# Patient Record
Sex: MALE | Race: WHITE
[De-identification: names, ages, dates, MRNs, and addresses within clinical notes are randomized per-mention and may not be internally consistent; named-entity substitution may affect disease eponyms.]

---

## 2019-04-16 ENCOUNTER — HOSPITAL ENCOUNTER (OUTPATIENT)
Age: 59
Discharge: HOME | End: 2019-04-16
Payer: COMMERCIAL

## 2019-04-16 VITALS — BODY MASS INDEX: 34.7 KG/M2

## 2019-04-16 DIAGNOSIS — E78.2: ICD-10-CM

## 2019-04-16 DIAGNOSIS — I10: ICD-10-CM

## 2019-04-16 DIAGNOSIS — R35.0: Primary | ICD-10-CM

## 2019-04-16 LAB
ALANINE AMINOTRANSFER ALT/SGPT: 33 U/L (ref 16–61)
ALBUMIN SERPL-MCNC: 4.1 G/DL (ref 3.2–5)
ALKALINE PHOSPHATASE: 71 U/L (ref 45–117)
ANION GAP: 6 (ref 5–15)
AST(SGOT): 21 U/L (ref 15–37)
BUN SERPL-MCNC: 19 MG/DL (ref 7–18)
BUN/CREAT RATIO: 15.3 RATIO (ref 10–20)
CALCIUM SERPL-MCNC: 8.7 MG/DL (ref 8.5–10.1)
CARBON DIOXIDE: 25 MMOL/L (ref 21–32)
CHLORIDE: 109 MMOL/L (ref 98–107)
CHOLEST SERPL-MCNC: 176 MG/DL
DEPRECATED RDW RBC: 45.2 FL (ref 35.1–43.9)
DIFFERENTIAL INDICATED: (no result)
ERYTHROCYTE [DISTWIDTH] IN BLOOD: 13.7 % (ref 11.6–14.6)
EST GLOM FILT RATE - AFR AMER: 77 ML/MIN (ref 60–?)
GLOBULIN: 2.9 G/DL (ref 2.2–4.2)
GLUCOSE: 92 MG/DL (ref 74–106)
HCT VFR BLD AUTO: 43.9 % (ref 40–54)
HEMOGLOBIN: 14.5 G/DL (ref 13–16.5)
HGB BLD-MCNC: 14.5 G/DL (ref 13–16.5)
IMMATURE GRANULOCYTES COUNT: 0.01 X10^3/UL (ref 0–0)
MCV RBC: 92.8 FL (ref 80–94)
MEAN CORP HGB CONC: 33 G/GL (ref 32–36)
MEAN PLATELET VOL.: 11.4 FL (ref 6.2–12)
PLATELET # BLD: 251 K/MM3 (ref 150–450)
PLATELET COUNT: 251 K/MM3 (ref 150–450)
POSITIVE COUNT: NO
POSITIVE DIFFERENTIAL: NO
POSITIVE MORPHOLOGY: NO
POTASSIUM: 4 MMOL/L (ref 3.5–5.1)
PSA,TOTAL - ANNUAL SCREEN: 0.67 NG/ML (ref 0–4)
RBC # BLD AUTO: 4.73 M/MM3 (ref 4.6–6.2)
RBC DISTRIBUTION WIDTH CV: 13.7 % (ref 11.6–14.6)
RBC DISTRIBUTION WIDTH SD: 45.2 FL (ref 35.1–43.9)
TRIGLYCERIDES: 170 MG/DL
VLDLC SERPL-MCNC: 34 MG/DL (ref 5–40)
WBC # BLD AUTO: 6.4 K/MM3 (ref 4.4–11)
WHITE BLOOD COUNT: 6.4 K/MM3 (ref 4.4–11)

## 2019-04-16 PROCEDURE — 80061 LIPID PANEL: CPT

## 2019-04-16 PROCEDURE — G0103 PSA SCREENING: HCPCS

## 2019-04-16 PROCEDURE — 85025 COMPLETE CBC W/AUTO DIFF WBC: CPT

## 2019-04-16 PROCEDURE — 80053 COMPREHEN METABOLIC PANEL: CPT

## 2019-04-16 PROCEDURE — 84153 ASSAY OF PSA TOTAL: CPT

## 2020-05-19 ENCOUNTER — HOSPITAL ENCOUNTER (OUTPATIENT)
Age: 60
End: 2020-05-19
Payer: COMMERCIAL

## 2020-05-19 VITALS — BODY MASS INDEX: 35.3 KG/M2

## 2020-05-19 DIAGNOSIS — Z12.5: ICD-10-CM

## 2020-05-19 DIAGNOSIS — I10: Primary | ICD-10-CM

## 2020-05-19 DIAGNOSIS — E78.2: ICD-10-CM

## 2020-05-19 LAB
ALANINE AMINOTRANSFER ALT/SGPT: 46 U/L (ref 16–61)
ALBUMIN SERPL-MCNC: 4.2 G/DL (ref 3.2–5)
ALKALINE PHOSPHATASE: 67 U/L (ref 45–117)
ANION GAP: 7 (ref 5–15)
AST(SGOT): 26 U/L (ref 15–37)
BUN SERPL-MCNC: 20 MG/DL (ref 7–18)
BUN/CREAT RATIO: 16.7 RATIO (ref 10–20)
CALCIUM SERPL-MCNC: 9.3 MG/DL (ref 8.5–10.1)
CARBON DIOXIDE: 27 MMOL/L (ref 21–32)
CHLORIDE: 107 MMOL/L (ref 98–107)
CHOLEST SERPL-MCNC: 202 MG/DL
DEPRECATED RDW RBC: 46.4 FL (ref 35.1–43.9)
ERYTHROCYTE [DISTWIDTH] IN BLOOD: 13 % (ref 11.6–14.6)
EST GLOM FILT RATE - AFR AMER: 80 ML/MIN (ref 60–?)
GLOBULIN: 3.2 G/DL (ref 2.2–4.2)
GLUCOSE: 107 MG/DL (ref 74–106)
HCT VFR BLD AUTO: 46.2 % (ref 40–54)
HEMOGLOBIN: 15 G/DL (ref 13–16.5)
HGB BLD-MCNC: 15 G/DL (ref 13–16.5)
IMMATURE GRANULOCYTES COUNT: 0.02 X10^3/UL (ref 0–0)
MCV RBC: 96.3 FL (ref 80–94)
MEAN CORP HGB CONC: 32.5 G/DL (ref 32–36)
MEAN PLATELET VOL.: 11.3 FL (ref 6.2–12)
NRBC FLAGGED BY ANALYZER: 0 % (ref 0–5)
PLATELET # BLD: 268 K/MM3 (ref 150–450)
PLATELET COUNT: 268 K/MM3 (ref 150–450)
POTASSIUM: 4 MMOL/L (ref 3.5–5.1)
PSA,TOTAL - ANNUAL SCREEN: 0.9 NG/ML (ref 0–4)
RBC # BLD AUTO: 4.8 M/MM3 (ref 4.6–6.2)
RBC DISTRIBUTION WIDTH CV: 13 % (ref 11.6–14.6)
RBC DISTRIBUTION WIDTH SD: 46.4 FL (ref 35.1–43.9)
TRIGLYCERIDES: 224 MG/DL
VLDLC SERPL-MCNC: 45 MG/DL (ref 5–40)
WBC # BLD AUTO: 6.8 K/MM3 (ref 4.4–11)
WHITE BLOOD COUNT: 6.8 K/MM3 (ref 4.4–11)

## 2020-05-19 PROCEDURE — 84153 ASSAY OF PSA TOTAL: CPT

## 2020-05-19 PROCEDURE — 80053 COMPREHEN METABOLIC PANEL: CPT

## 2020-05-19 PROCEDURE — 80061 LIPID PANEL: CPT

## 2020-05-19 PROCEDURE — 85025 COMPLETE CBC W/AUTO DIFF WBC: CPT

## 2020-05-19 PROCEDURE — G0103 PSA SCREENING: HCPCS

## 2020-10-27 ENCOUNTER — HOSPITAL ENCOUNTER (OUTPATIENT)
Dept: HOSPITAL 100 - OLS.AHF | Age: 60
Discharge: HOME | End: 2020-10-27
Payer: COMMERCIAL

## 2020-10-27 VITALS — BODY MASS INDEX: 36.4 KG/M2

## 2020-10-27 DIAGNOSIS — N30.90: Primary | ICD-10-CM

## 2020-10-27 PROCEDURE — 87086 URINE CULTURE/COLONY COUNT: CPT

## 2021-08-05 ENCOUNTER — HOSPITAL ENCOUNTER (OUTPATIENT)
Age: 61
Discharge: HOME | End: 2021-08-05
Payer: COMMERCIAL

## 2021-08-05 VITALS — BODY MASS INDEX: 35.6 KG/M2

## 2021-08-05 DIAGNOSIS — E78.2: ICD-10-CM

## 2021-08-05 DIAGNOSIS — I10: Primary | ICD-10-CM

## 2021-08-05 DIAGNOSIS — R35.0: ICD-10-CM

## 2021-08-05 LAB
ALANINE AMINOTRANSFER ALT/SGPT: 43 U/L (ref 16–61)
ALBUMIN SERPL-MCNC: 4 G/DL (ref 3.2–5)
ALKALINE PHOSPHATASE: 59 U/L (ref 45–117)
ANION GAP: 8 (ref 5–15)
AST(SGOT): 23 U/L (ref 15–37)
BUN SERPL-MCNC: 18 MG/DL (ref 7–18)
BUN/CREAT RATIO: 14.6 RATIO (ref 10–20)
CALCIUM SERPL-MCNC: 8.8 MG/DL (ref 8.5–10.1)
CARBON DIOXIDE: 24 MMOL/L (ref 21–32)
CHLORIDE: 108 MMOL/L (ref 98–107)
CHOLEST SERPL-MCNC: 186 MG/DL
DEPRECATED RDW RBC: 47.8 FL (ref 35.1–43.9)
ERYTHROCYTE [DISTWIDTH] IN BLOOD: 13.4 % (ref 11.6–14.6)
EST GLOM FILT RATE - AFR AMER: 78 ML/MIN (ref 60–?)
GLOBULIN: 3.1 G/DL (ref 2.2–4.2)
GLUCOSE: 119 MG/DL (ref 74–106)
HCT VFR BLD AUTO: 45 % (ref 40–54)
HEMOGLOBIN: 14.7 G/DL (ref 13–16.5)
HGB BLD-MCNC: 14.7 G/DL (ref 13–16.5)
IMMATURE GRANULOCYTES COUNT: 0.01 X10^3/UL (ref 0–0)
MCV RBC: 96.6 FL (ref 80–94)
MEAN CORP HGB CONC: 32.7 G/DL (ref 32–36)
MEAN PLATELET VOL.: 11.6 FL (ref 6.2–12)
NRBC FLAGGED BY ANALYZER: 0 % (ref 0–5)
PLATELET # BLD: 254 K/MM3 (ref 150–450)
PLATELET COUNT: 254 K/MM3 (ref 150–450)
POTASSIUM: 4 MMOL/L (ref 3.5–5.1)
PSA,TOTAL - ANNUAL SCREEN: 1.05 NG/ML (ref 0–4)
RBC # BLD AUTO: 4.66 M/MM3 (ref 4.6–6.2)
RBC DISTRIBUTION WIDTH CV: 13.4 % (ref 11.6–14.6)
RBC DISTRIBUTION WIDTH SD: 47.8 FL (ref 35.1–43.9)
TRIGLYCERIDES: 329 MG/DL
VLDLC SERPL-MCNC: 66 MG/DL (ref 5–40)
WBC # BLD AUTO: 5.8 K/MM3 (ref 4.4–11)
WHITE BLOOD COUNT: 5.8 K/MM3 (ref 4.4–11)

## 2021-08-05 PROCEDURE — 85025 COMPLETE CBC W/AUTO DIFF WBC: CPT

## 2021-08-05 PROCEDURE — 80053 COMPREHEN METABOLIC PANEL: CPT

## 2021-08-05 PROCEDURE — G0103 PSA SCREENING: HCPCS

## 2021-08-05 PROCEDURE — 80061 LIPID PANEL: CPT

## 2021-08-05 PROCEDURE — 84153 ASSAY OF PSA TOTAL: CPT

## 2021-10-05 ENCOUNTER — HOSPITAL ENCOUNTER (OUTPATIENT)
Age: 61
End: 2021-10-05
Payer: COMMERCIAL

## 2021-10-05 DIAGNOSIS — Z01.810: ICD-10-CM

## 2021-10-05 DIAGNOSIS — Z01.812: Primary | ICD-10-CM

## 2021-10-05 LAB
ANION GAP: 6 (ref 5–15)
BUN SERPL-MCNC: 16 MG/DL (ref 7–18)
BUN/CREAT RATIO: 15.1 RATIO (ref 10–20)
CALCIUM SERPL-MCNC: 8.8 MG/DL (ref 8.5–10.1)
CARBON DIOXIDE: 30 MMOL/L (ref 21–32)
CHLORIDE: 106 MMOL/L (ref 98–107)
DEPRECATED RDW RBC: 46.6 FL (ref 35.1–43.9)
ERYTHROCYTE [DISTWIDTH] IN BLOOD: 13.2 % (ref 11.6–14.6)
EST GLOM FILT RATE - AFR AMER: 92 ML/MIN (ref 60–?)
GLUCOSE: 80 MG/DL (ref 74–106)
HCT VFR BLD AUTO: 44.2 % (ref 40–54)
HEMOGLOBIN: 14.9 G/DL (ref 13–16.5)
HGB BLD-MCNC: 14.9 G/DL (ref 13–16.5)
MCV RBC: 95.1 FL (ref 80–94)
MEAN CORP HGB CONC: 33.7 G/DL (ref 32–36)
MEAN PLATELET VOL.: 10.6 FL (ref 6.2–12)
PLATELET # BLD: 244 K/MM3 (ref 150–450)
PLATELET COUNT: 244 K/MM3 (ref 150–450)
POTASSIUM: 4 MMOL/L (ref 3.5–5.1)
RBC # BLD AUTO: 4.65 M/MM3 (ref 4.6–6.2)
RBC DISTRIBUTION WIDTH CV: 13.2 % (ref 11.6–14.6)
RBC DISTRIBUTION WIDTH SD: 46.6 FL (ref 35.1–43.9)
WBC # BLD AUTO: 5.4 K/MM3 (ref 4.4–11)
WHITE BLOOD COUNT: 5.4 K/MM3 (ref 4.4–11)

## 2021-10-05 PROCEDURE — 85027 COMPLETE CBC AUTOMATED: CPT

## 2021-10-05 PROCEDURE — 93005 ELECTROCARDIOGRAM TRACING: CPT

## 2021-10-05 PROCEDURE — 80048 BASIC METABOLIC PNL TOTAL CA: CPT

## 2021-10-05 PROCEDURE — 36415 COLL VENOUS BLD VENIPUNCTURE: CPT

## 2022-09-19 ENCOUNTER — HOSPITAL ENCOUNTER (OUTPATIENT)
Age: 62
Discharge: HOME | End: 2022-09-19
Payer: COMMERCIAL

## 2022-09-19 DIAGNOSIS — I10: Primary | ICD-10-CM

## 2022-09-19 DIAGNOSIS — Z12.5: ICD-10-CM

## 2022-09-19 DIAGNOSIS — E78.2: ICD-10-CM

## 2022-09-19 DIAGNOSIS — R35.0: ICD-10-CM

## 2022-09-19 LAB
ALANINE AMINOTRANSFER ALT/SGPT: 44 U/L (ref 16–61)
ALBUMIN SERPL-MCNC: 3.8 G/DL (ref 3.2–5)
ALKALINE PHOSPHATASE: 62 U/L (ref 45–117)
ANION GAP: 8 (ref 5–15)
AST(SGOT): 27 U/L (ref 15–37)
BUN SERPL-MCNC: 14 MG/DL (ref 7–18)
BUN/CREAT RATIO: 13.1 RATIO (ref 10–20)
CALCIUM SERPL-MCNC: 9.1 MG/DL (ref 8.5–10.1)
CARBON DIOXIDE: 26 MMOL/L (ref 21–32)
CHLORIDE: 110 MMOL/L (ref 98–107)
CHOLEST SERPL-MCNC: 167 MG/DL
DEPRECATED RDW RBC: 47.8 FL (ref 35.1–43.9)
ERYTHROCYTE [DISTWIDTH] IN BLOOD: 13.2 % (ref 11.6–14.6)
EST GLOM FILT RATE - AFR AMER: 91 ML/MIN (ref 60–?)
GLOBULIN: 3.1 G/DL (ref 2.2–4.2)
GLUCOSE: 97 MG/DL (ref 74–106)
HCT VFR BLD AUTO: 41.9 % (ref 40–54)
HEMOGLOBIN: 14 G/DL (ref 13–16.5)
HGB BLD-MCNC: 14 G/DL (ref 13–16.5)
IMMATURE GRANULOCYTES COUNT: 0.02 X10^3/UL (ref 0–0)
MCV RBC: 96.3 FL (ref 80–94)
MEAN CORP HGB CONC: 33.4 G/DL (ref 32–36)
MEAN PLATELET VOL.: 11.8 FL (ref 6.2–12)
NRBC FLAGGED BY ANALYZER: 0 % (ref 0–5)
PLATELET # BLD: 232 K/MM3 (ref 150–450)
PLATELET COUNT: 232 K/MM3 (ref 150–450)
POTASSIUM: 3.9 MMOL/L (ref 3.5–5.1)
PSA,TOTAL - ANNUAL SCREEN: 0.78 NG/ML (ref 0–4)
RBC # BLD AUTO: 4.35 M/MM3 (ref 4.6–6.2)
RBC DISTRIBUTION WIDTH CV: 13.2 % (ref 11.6–14.6)
RBC DISTRIBUTION WIDTH SD: 47.8 FL (ref 35.1–43.9)
TRIGLYCERIDES: 236 MG/DL
VLDLC SERPL-MCNC: 47 MG/DL (ref 5–40)
WBC # BLD AUTO: 6.3 K/MM3 (ref 4.4–11)
WHITE BLOOD COUNT: 6.3 K/MM3 (ref 4.4–11)

## 2022-09-19 PROCEDURE — 80053 COMPREHEN METABOLIC PANEL: CPT

## 2022-09-19 PROCEDURE — 84153 ASSAY OF PSA TOTAL: CPT

## 2022-09-19 PROCEDURE — G0103 PSA SCREENING: HCPCS

## 2022-09-19 PROCEDURE — 80061 LIPID PANEL: CPT

## 2022-09-19 PROCEDURE — 85025 COMPLETE CBC W/AUTO DIFF WBC: CPT

## 2023-11-01 ENCOUNTER — HOSPITAL ENCOUNTER (OUTPATIENT)
Age: 63
Discharge: HOME | End: 2023-11-01
Payer: COMMERCIAL

## 2023-11-01 DIAGNOSIS — I10: ICD-10-CM

## 2023-11-01 DIAGNOSIS — E78.2: ICD-10-CM

## 2023-11-01 DIAGNOSIS — R35.0: Primary | ICD-10-CM

## 2023-11-01 LAB
ALANINE AMINOTRANSFER ALT/SGPT: 42 U/L (ref 16–61)
ALBUMIN SERPL-MCNC: 3.7 G/DL (ref 3.2–5)
ALKALINE PHOSPHATASE: 64 U/L (ref 45–117)
ANION GAP: 6 (ref 5–15)
AST(SGOT): 23 U/L (ref 15–37)
BUN SERPL-MCNC: 17 MG/DL (ref 7–18)
BUN/CREAT RATIO: 15.7 RATIO (ref 10–20)
CALCIUM SERPL-MCNC: 9.4 MG/DL (ref 8.5–10.1)
CARBON DIOXIDE: 27 MMOL/L (ref 21–32)
CHLORIDE: 107 MMOL/L (ref 98–107)
CHOLEST SERPL-MCNC: 171 MG/DL
DEPRECATED RDW RBC: 47.9 FL (ref 35.1–43.9)
ERYTHROCYTE [DISTWIDTH] IN BLOOD: 13.5 % (ref 11.6–14.6)
EST GLOM FILT RATE - AFR AMER: 89 ML/MIN (ref 60–?)
GLOBULIN: 3.5 G/DL (ref 2.2–4.2)
GLUCOSE: 91 MG/DL (ref 74–106)
HCT VFR BLD AUTO: 44.4 % (ref 40–54)
HEMOGLOBIN: 14.6 G/DL (ref 13–16.5)
HGB BLD-MCNC: 14.6 G/DL (ref 13–16.5)
IMMATURE GRANULOCYTES COUNT: 0.01 X10^3/UL (ref 0–0)
MCV RBC: 96.1 FL (ref 80–94)
MEAN CORP HGB CONC: 32.9 G/DL (ref 32–36)
MEAN PLATELET VOL.: 10.9 FL (ref 6.2–12)
NRBC FLAGGED BY ANALYZER: 0 % (ref 0–5)
PLATELET # BLD: 263 K/MM3 (ref 150–450)
PLATELET COUNT: 263 K/MM3 (ref 150–450)
POTASSIUM: 4.1 MMOL/L (ref 3.5–5.1)
PSA,TOTAL - ANNUAL SCREEN: 0.9 NG/ML (ref 0–4)
RBC # BLD AUTO: 4.62 M/MM3 (ref 4.6–6.2)
RBC DISTRIBUTION WIDTH CV: 13.5 % (ref 11.6–14.6)
RBC DISTRIBUTION WIDTH SD: 47.9 FL (ref 35.1–43.9)
TRIGLYCERIDES: 163 MG/DL
VLDLC SERPL-MCNC: 33 MG/DL (ref 5–40)
WBC # BLD AUTO: 6.6 K/MM3 (ref 4.4–11)
WHITE BLOOD COUNT: 6.6 K/MM3 (ref 4.4–11)

## 2023-11-01 PROCEDURE — 80061 LIPID PANEL: CPT

## 2023-11-01 PROCEDURE — 80053 COMPREHEN METABOLIC PANEL: CPT

## 2023-11-01 PROCEDURE — 85025 COMPLETE CBC W/AUTO DIFF WBC: CPT

## 2023-11-01 PROCEDURE — 84153 ASSAY OF PSA TOTAL: CPT

## 2023-11-01 PROCEDURE — G0103 PSA SCREENING: HCPCS

## 2025-01-07 ENCOUNTER — HOSPITAL ENCOUNTER (OUTPATIENT)
Age: 65
Discharge: HOME | End: 2025-01-07
Payer: OTHER GOVERNMENT

## 2025-01-07 DIAGNOSIS — E78.2: ICD-10-CM

## 2025-01-07 DIAGNOSIS — R35.0: ICD-10-CM

## 2025-01-07 DIAGNOSIS — I10: Primary | ICD-10-CM

## 2025-01-07 LAB
ALANINE AMINOTRANSFER ALT/SGPT: 46 U/L (ref 16–61)
ALBUMIN SERPL-MCNC: 3.7 G/DL (ref 3.2–5)
ALKALINE PHOSPHATASE: 61 U/L (ref 45–117)
ANION GAP: 2 (ref 5–15)
AST(SGOT): 24 U/L (ref 15–37)
BUN SERPL-MCNC: 21 MG/DL (ref 7–18)
BUN/CREAT RATIO: 15.9 RATIO (ref 10–20)
CALCIUM SERPL-MCNC: 9.1 MG/DL (ref 8.5–10.1)
CARBON DIOXIDE: 31 MMOL/L (ref 21–32)
CHLORIDE: 108 MMOL/L (ref 98–107)
CHOLEST SERPL-MCNC: 177 MG/DL
DEPRECATED RDW RBC: 47.1 FL (ref 35.1–43.9)
ERYTHROCYTE [DISTWIDTH] IN BLOOD: 13.4 % (ref 11.6–14.6)
EST GLOM FILT RATE - AFR AMER: 71 ML/MIN (ref 60–?)
GLOBULIN: 3.4 G/DL (ref 2.2–4.2)
GLUCOSE: 111 MG/DL (ref 74–106)
HCT VFR BLD AUTO: 42.9 % (ref 40–54)
HEMOGLOBIN: 14.1 G/DL (ref 13–16.5)
HGB BLD-MCNC: 14.1 G/DL (ref 13–16.5)
IMMATURE GRANULOCYTES COUNT: 0.01 X10^3/UL (ref 0–0)
MCV RBC: 95.3 FL (ref 80–94)
MEAN CORP HGB CONC: 32.9 G/DL (ref 32–36)
MEAN PLATELET VOL.: 10.7 FL (ref 6.2–12)
NRBC FLAGGED BY ANALYZER: 0 % (ref 0–5)
PLATELET # BLD: 243 K/MM3 (ref 150–450)
PLATELET COUNT: 243 K/MM3 (ref 150–450)
POTASSIUM: 4.6 MMOL/L (ref 3.5–5.1)
PSA,TOTAL - ANNUAL SCREEN: 1.13 NG/ML (ref 0–4)
RBC # BLD AUTO: 4.5 M/MM3 (ref 4.6–6.2)
RBC DISTRIBUTION WIDTH CV: 13.4 % (ref 11.6–14.6)
RBC DISTRIBUTION WIDTH SD: 47.1 FL (ref 35.1–43.9)
TRIGLYCERIDES: 194 MG/DL
VLDLC SERPL-MCNC: 39 MG/DL (ref 5–40)
WBC # BLD AUTO: 6.2 K/MM3 (ref 4.4–11)
WHITE BLOOD COUNT: 6.2 K/MM3 (ref 4.4–11)

## 2025-01-07 PROCEDURE — 80061 LIPID PANEL: CPT

## 2025-01-07 PROCEDURE — 85025 COMPLETE CBC W/AUTO DIFF WBC: CPT

## 2025-01-07 PROCEDURE — G0103 PSA SCREENING: HCPCS

## 2025-01-07 PROCEDURE — 84153 ASSAY OF PSA TOTAL: CPT

## 2025-01-07 PROCEDURE — 80053 COMPREHEN METABOLIC PANEL: CPT

## 2025-01-10 NOTE — XMS RPT_ITS
Comprehensive CCD (C-CDA v2.1)  
  
                          Created on: January 10, 2025  
  
  
WALDO DENNY  
External Reference #: CDR,PersonID:[id number]  
: 1960  
Sex: Male  
  
Demographics  
  
  
                                        Address             [address] [place]  [address]  
   
                                        Home Phone          [phone]  
   
                                        Home Phone          [phone]  
   
                                        Work Phone          [phone]  
   
                                        Preferred Language  en  
   
                                        Marital Status        
   
                                        Hinduism Affiliation Unknown  
   
                                        Race                White  
   
                                        Ethnic Group        Not  or Lati  
no  
  
  
Author  
  
  
                                        Organization        [facility]  
  
  
Care Team Providers  
  
  
                                Care Team Member Name Role            Phone  
   
                                Yair Calix MD Unavailable     [phone]  
   
                                Jet Neil Primary Care Provider [phone]  
   
                                Jet Neil Primary Care Provider [phone]  
   
                                PROVIDER, UNKNOWN Referring       Unavailable  
   
                                Hodan Zendejas Attending       Unavailable  
   
                                [facility]    Unavailable  
   
                                PROVIDER, UNKNOWN Referring       Unavailable  
   
                                Hodan Zendejas Attending       Unavailable  
   
                                [facility]    Unavailable  
   
                                MISA VERA Attending       Unavailable  
   
                                MISA VERA Attending       Unavailable  
   
                                HODAN ZENDEJAS Referring       Unavailable  
   
                                MISA VERA Attending       Unavailable  
   
                                HODAN ZENDEJAS Referring       Unavailable  
   
                                Guicho APRN.Jet HERRERA Primary [facility] Provide  
[phone]  
   
                                Amari Genao Unavailable     [phone]  
[phone]  
   
                                MINNIE PATEL       Attending       Unavailable  
   
                                JET NEIL [facility]    Unavailable  
   
                                JET NEIL [facility]    Unavailable  
   
                                Jet Neil Primary Care Provider [phone]  
  
  
  
Allergies  
  
  
                                                    Allergy   
Classification                          Reported   
Allergen(s)               Allergy Type              Date of   
Onset                     Reaction(s)               Facility  
   
                                                      
(1 source)   PLANT POLLENS drug allergy 2019                [facility]  
Work Phone:   
[phone]  
  
  
  
Medications  
Current Medications  
  
  
  
                      Medication Drug Class(es) Dates      Sig (Normalized) Sig   
(Original)  
   
                                                    Acetaminophen  
(1 source)                                          Start:   
2020                                          acetaminophen   
(TYLENOL) tablet   
650 mg  
   
                                                    amoxicillin 875 mg   
oral tablet  
(1 source)                              Penicillin-class   
Antibacterial                           Start:   
2023  
End:   
2023                              take 1 tablet by   
mouth twice daily                       amoxicillin   
(AMOXIL) 875 mg   
tablet   
Indications: Right   
acute suppurative   
otitis media Take   
1 tablet by mouth   
twice daily for 7   
days. 14 tablet 0   
2023 Active  
   
                                        Comment on above:   Take 1 tablet by [facility] twice daily for 7 days.   
   
                                                    aspirin 81 mg   
chewable tablet  
(4 sources)                             Platelet   
Aggregation   
Inhibitor,   
Nonsteroidal   
Anti-inflammatory   
Drug                                    Start:   
2020                              take 81 mg by   
mouth once daily                        81 mg, Oral,   
DAILY, First dose   
on Fri 3/6/20 at   
0900  
  
  
  
                                                    Start: 2020  
End: 2020                                     aspirin chewable tablet 324   
mg  
   
                                Start: 2019                 ADULT ASPIRIN   
REGIMEN 81 MG TBEC 1 tablet daily    
ASPIRIN [id number] Yair Calix MD  
  
  
  
                                                    0.4 ml enoxaparin   
sodium 100 mg/ml   
prefilled syringe  
(1 source)                              Low Molecular   
Weight Heparin                          Start:   
2020                              inject 40 mg by   
subcutaneous   
injection once   
daily                                   40 mg,   
Subcutaneous,   
DAILY, First dose   
on Fri 3/6/20 at   
0900  
   
                                                    ofloxacin 3 mg/ml   
otic solution  
(1 source)                              Quinolone   
Antimicrobial                           Start:   
2023  
End: 2023                                     ofloxacin (FLOXIN)   
0.3 % otic   
solution   
Indications: Acute   
otitis externa of   
right ear,   
unspecified type   
Use 10 Drops in   
the left ear once   
daily for 7 days.   
IN LEFT EAR ONLY 5   
mL 0 2023 Active  
   
                                        Comment on above:   Use 10 Drops in the   
left ear once daily for 7 days. IN LEFT   
EAR ONLY   
   
                                                    perflutren lipid   
microspheres   
(DEFINITY)   
injection 1.65 mg  
(1 source)                                          Start:   
2020  
End: 2020                                     perflutren lipid   
microspheres   
(DEFINITY)   
injection 1.65 mg  
   
                                                    polyethylene   
glycol [id number]   
mg powder for   
oral solution  
(1 source)                Osmotic Laxative          Start:   
2020                                          17 g, Oral, DAILY   
PRN, Constipation,   
Starting Thu   
3/5/20 at 2322   
First line therapy   
for constipation  
   
                                                    Promethazine  
(1 source)                Phenothiazine             Start:   
2020                                          promethazine   
(PHENERGAN) tablet   
12.5 mg  
   
                                                    3 ml sodium   
chloride 9 mg/ml   
injection  
(3 sources)                                         Start:   
2020  
End: 2020                                     sodium chloride   
flush 0.9 %   
injection 10 mL  
  
  
  
                                Start: 2020                 10 mL, Intrave  
nous, EVERY 12 HOURS   
SCHEDULED (2 times per day), First   
dose on Thu 3/5/20 at 2345  
   
                                        Start: 2020   take 10 mL intraveno  
us route once   
as needed                               10 mL, Intravenous, PRN, Line   
Care, After every IV line use,   
Starting u 3/5/20 at 2322  
  
  
  
                                                    valsartan 80 mg   
oral tablet  
(1 source)                              Angiotensin 2   
Receptor Blocker          Start: 2020         take 80 mg by   
mouth once   
daily                                   80 mg, Oral,   
DAILY, First dose   
on Fri 3/6/20 at   
0900 Substituted   
for Candesartan   
(ATACAND)  
  
  
  
Completed/Discontinued Medications  
  
  
  
                      Medication Drug Class(es) Dates      Sig (Normalized) Sig   
(Original)  
   
                                                    candesartan   
cilexetil 16 mg   
oral tablet  
(6 sources)                             Angiotensin 2   
Receptor Blocker                        Start:   
2019                                          CANDESARTAN   
CILEXETIL 16 MG   
TABS 1 tablet   
daily    
CANDESARTAN   
CILEXETIL   
[id number]   
Yair Calix MD  
   
                                        Comment on above:   Take 16 mg by mouth   
once daily.   
   
                                                    ibuprofen 200 mg   
oral tablet  
(1 source)                              Nonsteroidal   
Anti-inflammatory   
Drug                                    Start:   
2019                                          ADVIL 200 MG TABS   
2 tablets every 4   
hours as needed   
   
IBUPROFEN   
[id number]   
Yair Calix MD  
   
                                                    sodium polystyrene   
sulfonate 250 mg/ml   
oral suspension  
(1 source)                                          Start:   
2020  
End:   
2020                                          sodium polystyrene   
(KAYEXALATE) 15   
GM/60ML suspension   
30 g  
  
  
  
                                                    Start: 2020  
End: 2020                                     sodium polystyrene (KAYEXALA  
TE) 15 GM/60ML suspension 30 g  
  
  
  
Problems  
Active Problems  
  
  
                      Problem Classification Problem    Date       Documented Da  
te Episodic/Chronic  
   
                                                    Osteoarthritis  
(2 sources)                             Primary   
osteoarthritis,   
left shoulder;   
Translations:   
[Primary   
osteoarthritis,   
left shoulder]                          Onset:   
10-                                          Chronic  
   
                                                    Other ear and sense   
organ disorders  
(1 source)                              Acute otitis   
externa of right   
ear; Translations:   
[Unspecified acute   
noninfective otitis   
externa, right ear]                                         Episodic  
   
                                                    Other non-traumatic   
joint disorders  
(2 sources)                             Pain in left   
shoulder;   
Translations: [Pain   
in left shoulder]                       Onset:   
10-                                          Episodic  
   
                                                    Other upper   
respiratory infections  
(1 source)                              Acute pharyngitis,   
unspecified;   
Translations:   
[Pharyngitis,   
unspecified   
etiology]                               Onset:   
10-                                          Episodic  
   
                                                    Otitis media and   
related conditions  
(1 source)                              Acute suppurative   
otitis media;   
Translations:   
[Acute suppurative   
otitis media   
without spontaneous   
rupture of ear   
drum, right ear]                                            Episodic  
  
  
Past or Other Problems  
  
  
                      Problem Classification Problem    Date       Documented Da  
te Episodic/Chronic  
   
                                                    Nonspecific chest pain  
(7 sources)                             Chest pain;   
Translations:   
[Chest pain,   
unspecified]                            Onset:   
2020                Episodic  
   
                                                    Other non-traumatic   
joint disorders  
(1 source)                              Pain in left   
knee;   
Translations:   
[Pain in left   
knee]                                   Onset:   
2019                Episodic  
   
                                                    Sprains and strains  
(4 sources)                             Strain of other   
muscles, fascia   
and tendons at   
shoulder and   
upper arm level,   
left arm, initial   
encounter;   
Translations:   
[Strain of muscle   
of upper limb]                          Onset:   
2022                                          Episodic  
   
                                                    Unclassified  
(1 source)      Problem                                           
  
  
  
Results  
  
  
                          Test Name    Value        Interpretation Reference   
Range                                   Facility  
   
                                                    [facility] 10-   
   
                                        CNOV                Office Visit (WALKWA  
)  
---------------------  
-----------------  
WALDO DENNY   
([id number]) 1960   
M  
Date Time Provider   
Department  
10/17/23 3:45 PM   
MINNIE PATEL  
During your visit   
today, we recorded   
the following   
information about   
you:  
Temperature Pulse   
Blood pressure Weight  
98.6 degrees   
63/minute 130/70   
115.7 kg  
Minnie Patel APRN.CNP   
10/17/2023 4:10 PM   
Signed  
This note was created   
using [email or web address].  
Subjective  
Waldo Denny is a 62   
year old male.  
HPI by patient:  
Waldo Denny is a 62   
year old presenting   
to the office with   
the complaint of  
viral symptoms.  
Started approximately   
 8 days  prior.  
Associated symptoms   
include congestion,   
sore throat,   
congestion, fatigue,   
and  
 itchy  ears.  
Denies fever, body   
aches, gi symptoms,   
headache.  
Covid Immunization   
Dates  
Overdue - Covid-19   
Vaccine (   
season) Overdue since   
2021 Imm Admin:   
COVID-19 original   
vaccine, full dose,   
monovalent  
(MODERNA)  
2021 Imm Admin:   
COVID-19 original   
vaccine, full dose,   
monovalent  
(MODERNA)  
2020 Imm Admin:   
COVID-19 original   
vaccine, full dose,   
monovalent  
(MODERNA)  
Sick contacts: none.  
Smoking   
history/second hand   
smoke: none.  
OTC not helping.  
No antibiotic use in   
the last 60 days.  
ALLERGIES  
No Known Allergies  
No family history on   
file.  
Social History  
Tobacco Use  
Smoking status:   
Former  
Smokeless tobacco:   
Never  
Alcohol use: No  
Drug use: No  
Active Ambulatory   
Problems  
No Active Ambulatory   
Problems  
Resolved Ambulatory   
Problems  
No Resolved   
Ambulatory Problems  
Past Medical History:  
No date: Hypertension  
Review of Systems  
Constitutional:   
Positive for fatigue.   
Negative for fever.  
HENT: Positive for   
congestion and sore   
throat.  
Eyes: Negative.  
Respiratory: Positive   
for cough.  
Cardiovascular:   
Negative.  
Gastrointestinal:   
Negative.  
Endocrine: Negative.  
Genitourinary:   
Negative.  
Musculoskeletal:   
Negative.  
Skin: Negative.  
Neurological:   
Negative.  
Objective  
/70   Pulse 63   
  Temp 37 ?C (98.6   
?F)   Wt 115.7 kg   
(255 lb)   SpO2  
97%   BMI 36.59 kg/m?  
Physical Exam  
Vitals reviewed.  
Constitutional:  
General: He is awake.   
He is not in acute   
distress.  
Appearance: He is not   
ill-appearing,   
toxic-appearing or   
diaphoretic.  
HENT:  
Head: Normocephalic   
and atraumatic.  
Right Ear: Tympanic   
membrane, ear canal   
and external ear   
normal.  
Left Ear: Ear canal   
and external ear   
normal. There is   
impacted cerumen.  
Nose: Nose normal.  
Right Sinus: No   
maxillary sinus   
tenderness or frontal   
sinus tenderness.  
Left Sinus: No   
maxillary sinus   
tenderness or frontal   
sinus tenderness.  
Mouth/Throat:  
Mouth: Mucous   
membranes are moist.  
Pharynx: Oropharynx   
is clear. No   
pharyngeal swelling,   
oropharyngeal exudate  
or posterior   
oropharyngeal   
erythema.  
Cardiovascular:  
Rate and Rhythm:   
Normal rate and   
regular rhythm.  
Pulmonary:  
Effort: Pulmonary   
effort is normal.  
Breath sounds: Normal   
breath sounds.  
Lymphadenopathy:  
Head:  
Right side of head:   
No submandibular or   
tonsillar adenopathy.  
Left side of head: No   
submandibular or   
tonsillar adenopathy.  
Cervical: No cervical   
adenopathy.  
Neurological:  
Mental Status: He is   
alert.  
Psychiatric:  
Behavior: Behavior is   
cooperative.  
Assessment and Plan  
(J02.9) Pharyngitis,   
unspecified etiology   
(primary encounter   
diagnosis)  
Plan: STREP A   
MOLECULAR (POC)  
(J06.9) Upper   
respiratory infection   
with cough and   
congestion  
Plan:   
amoxicillin-clavulani  
c acid (AUGMENTIN)   
875-125  
mg per tablet,   
fluticasone (FLONASE   
ALLERGY  
RELIEF) 50   
mcg/actuation nasal   
spray,  
fexofenadine (ALLEGRA   
ALLERGY) 180 mg   
tablet,  
COVID AND INFLUENZA   
A/B NAAT, ROUTINE  
(L29.9) Itching of   
ear  
Plan: fexofenadine   
(ALLEGRA ALLERGY) 180   
mg tablet  
Education on viral vs   
bacterial infections.   
Most viral infections   
will last 10  
days, sometimes 14.  
It is possible to   
have back to back   
viral infections. An   
antibiotic will not  
treat a virus.  
-Covid/flu test for   
rule out, results in   
48 hours, isolation   
in the interim.  
[facility]  
-States symptoms for   
about 8 days, will   
have patient try   
Augmentin. Remember   
if  
no improvement with   
antibiotic that this   
is likely viral and   
needs to run it's  
course.  
-Drink lots of fluids   
and get plenty of   
rest. Gargle with   
salt water 3  
times/day. OTC   
antihistamine.  
-Vaporizers, cool   
mist humidifiers,   
warm showers, and   
warm fluids help open  
respiratory and sinus   
passages. Clean   
humidifiers daily.  
-OTC   
tylenol/ibuprofen as   
directed on the   
bottle.  
-Saline nasal spray   
as needed. Flonase   
twice daily can help   
reduce inflammation  
through the sinus   
cavities.  
The patient will   
pursue further   
outpatient evaluation   
with the primary care  
physician or another   
Urgent Care/Express   
Care as outlined in   
the after visit  
summary. The patient   
is agreeable to this   
plan of care and   
follow-up  
instructions have   
been explained in   
detail. The patient   
has received these  
instructions in   
written format and   
have exp (more   
content not   
included)...        Normal                                  [facility]  
   
                                                    FLUABV + SARS-CoV-2 Pnl Resp  
 OLEGARIO+prbon 10-   
   
                                                    Influenza virus A and   
B RNA and SARS-CoV-2   
(COVID-19) N gene   
panel OLEGARIO+probe   
(Resp)                                  COVID 19 RESULT:  
Not detected  
The method used is   
RT-PCR or an   
equivalent NAAT   
method.  
  
Reference Range (the   
expected result in   
uninfected   
individuals): Not   
detected  
  
  
  
INFLUENZA A PCR:  
Not detected  
  
INFLUENZA B PCR:  
Not detected        Normal                                  [facility]  
   
                                        Comment on above:   Performed By: #### 9  
[id number] ####  
[facility]  
CLIA [id number]  
[address] [place], [place] [address]K [address] [facility]   
   
                                                    CNOVon 2023   
   
                                        CNOV                Office Visit (WALKWA  
)  
---------------------  
-----------------  
WALDO DENNY   
([id number]) 1960   
M  
Date Time Provider   
Department  
23 2:50 PM   
DARA MARIA  
During your visit   
today, we recorded   
the following   
information about   
you:  
Temperature Pulse   
Respiration Blood   
pressure  
98.2 degrees   
76/minute 16/minute   
133/76  
Weight Height  
112 kg 1.778 m  
Dara Maria PA-C 2023 3:13 PM   
Signed  
2023  
Patient presents   
with:  
Ear Problem: Left ear   
pain started a few   
days ago.  
SUBJECTIVE: This is a   
62 year old that is   
here today for 1 day   
history of left  
ear pain.  
Hurts to put his   
hearing aids in the   
left ear, but it also   
feels like there is  
pain is deeper inside   
the ear too.  
He has been congested   
over the past week or   
so with a cold.  
He has a h/o TM patch   
to the left ear and   
so, states that  We   
have to be  
careful what ear   
drops I put in   
there.  He does not   
know what his ENT   
deems  
 safe.   
No dizziness,   
vertigo, changes in   
hearing, or drainage.  
He denies any recent   
swimming or use of   
q-tips, ear plugs, or   
ear buds.  
No n/v/d, HA, or   
rash.  
Denies fever, chills,   
sweats, or fatigue.  
Patient denies   
wheezing, shortness   
of breath, increased   
WOB, or chest pain.  
No other URI   
symptoms.  
No other sick   
symptoms.  
Pain on scale of 0-10   
with 0 being no pain   
and 10 being greatest   
pain: 6  
Nothing makes the   
symptoms better.   
Nothing makes them   
worse.  
Self-treatment:. none  
The severity is mild   
and the symptoms are   
not improving.  
The patient did not   
have a similar   
problem in the last 3   
months.  
The patient did not   
take any antibiotics   
in the last 3 months.  
Barriers to learning:   
none.  
Reviewed meds, OTCs,   
herbals or   
supplements.  
Reviewed allergies,   
medications, social   
history, and past   
medical history.  
PAST MEDICAL HISTORY  
Diagnosis Date  
Hypertension  
ALLERGIES Patient has   
no known allergies.  
MEDICATIONS  
Current Outpatient   
Medications  
Medication Sig  
amoxicillin (AMOXIL)   
875 mg tablet Take 1   
tablet by mouth twice   
daily for 7  
days.  
ofloxacin (FLOXIN)   
0.3 % otic solution   
Use 10 Drops in the   
left ear once daily  
for 7 days. IN LEFT   
EAR ONLY  
candesartan (ATACAND)   
16 mg tablet Take 16   
mg by mouth once   
daily.  
No current   
facility-administered   
medications for this   
visit.  
Medications and   
allergies reviewed by   
this provider.  
SOCIAL HISTORY  
Social History  
Tobacco Use  
Smoking status:   
Former  
Smokeless tobacco:   
Never  
Substance Use Topics  
Alcohol use: No  
Drug use: No  
REVIEW OF SYSTEMS  
Review of Systems  
ROS:   
constitutional-neg,   
HENT-ear ache, Eyes-   
neg, heart-neg,   
respiratory-neg,  
skin-neg, lymph-neg,   
Allergy- neg,   
neuro-neg, - All   
systems neg except as   
noted  
above in HPI.  
OBJECTIVE:  
/76   Pulse 76   
  Temp 36.8 ?C (98.2   
?F) (Tympanic)   Resp   
16   Ht  
177.8 cm (5' 10 )     
Wt 112 kg (247 lb)     
SpO2 96%   BMI 35.44   
kg/m? . Vital  
signs reviewed by   
this provider.  
Physical Exam  
Constitutional:  
General: He is not in   
acute distress.  
Appearance: Normal   
appearance. He is   
well-developed and   
normal weight. He is  
not ill-appearing,   
toxic-appearing or   
diaphoretic.  
HENT:  
Head: Normocephalic   
and atraumatic. No   
right periorbital   
erythema or left  
periorbital erythema.  
Salivary Glands:   
Right salivary gland   
is not diffusely   
enlarged or tender.  
Left salivary gland   
is not diffusely   
enlarged or tender.  
Right Ear: Tympanic   
membrane, ear canal   
and external ear   
normal.  
Left Ear: Ear canal   
and external ear   
normal. Tenderness   
present. No drainage  
or swelling. A middle   
ear effusion is   
present. Tympanic   
membrane is injected  
and erythematous.  
Ears:  
Comments: Left ear:   
TM has scarring/patch   
and is erythematous,   
injected, and  
taut with dull   
effusion.  
+wax in the base of   
the canal- but TM   
still visible.  
+tragus tenderness to   
pumping  
Ear nelosn is   
erythematous and   
tender to the   
speculum.  
No edema or drainage.  
Nose: Nose normal. No   
congestion or   
rhinorrhea.  
Right Sinus: No   
maxillary sinus   
tenderness or frontal   
sinus tenderness.  
Left Sinus: No   
maxillary sinus   
tenderness or frontal   
sinus tenderness.  
Mouth/Throat:  
Lips: No lesions.  
Mouth: Mucous   
membranes are moist.   
No oral lesions.  
Dentition: No gum   
lesions.  
Tongue: No lesions.   
Tongue does not   
deviate from midline.  
Palate: No mass and   
lesions.  
Pharynx: Oropharynx   
is clear. No   
pharyngeal swelling,   
oropharyngeal   
exudate,  
posterior   
oropharyngeal   
erythema or uvula   
swelling.  
Tonsils: No tonsillar   
exudate or tonsillar   
abscesses.  
Eyes:  
General: Lids are   
normal. No scleral   
icterus.  
Right eye: No   
discharge.  
Left eye: No   
discharge.  
Extraocular   
Movements:   
Extraocular movements   
intact.  
Conjunctiva/sclera:   
Conjunctivae normal.  
Pupils: Pupils are   
equal, round, and   
reactive to light.  
Cardiovascular:  
Rate and Rhythm:   
Normal rate and   
regular rhythm.  
Heart sounds: Normal   
heart sounds.  
Pulmonary:  
Effort: Pulmonary   
effort is normal.  
Breath sounds: Normal   
breath sounds and air   
entry.  
(more content not   
included)...        Normal                                  [facility]  
   
                                                    Progress Noteon 2022   
   
                                        Progress Note       [facility]  
[facility]  
[address] [address]  
[facility]   
[id number] [phone]  
PHYSICAL THERAPY  
RE-EVALUATION  
Patient Name: Waldo Denny  
: 1960  
MRN: [id number]  
Today's Date:   
2022  
Visit Info  
General Visit   
Information  
General  
Chart Reviewed: Yes  
Reason for   
referral/mechanism of   
injury: (from  on   
10/20/22) Patient is   
a 61  
year old male who   
reports shoulder pain   
that has been going   
on for atleast 6  
months. He states it   
started where it is   
very uncomfortable   
and now has really  
caused increased pain   
and is unable to do   
things like   
previously. He had   
his R  
RTC repaired several   
years ago. He had a   
cortisone injection   
10 days ago that  
has greatly helped.   
He states pain is   
mostly on lateral   
aspect of the   
shoulder.  
He describes the pain   
as an aching and then   
will get very sharp   
and feel like a  
catching. He states   
increased difficulty   
with reaching,   
carrying things for  
long periods of time,   
reaching behind his   
back. he states   
increased difficulty  
with driving and   
mainly using his R   
hand. He states   
increased difficulty   
with  
sleeping and states   
wakes up several   
times throughout the   
night.  
General Comments:   
Reports he can still   
feel the tightness   
and soreness in the  
left shoulder however   
he is not having as   
much difficulty.   
Sleeping has  
significantly   
improved, which was   
his biggest complaint   
prior to therapy.  
Pain  
Pain Assessment  
Pain Assessment:   
No/denies pain  
Assessment  
General Assessments  
N/A  
Functional   
Assessments  
Apley's Scratch Test  
L Functional ER: T4  
L Functional IR: T8  
Extremity/Ortho   
Assessments  
Shoulder  
L Shoulder   
Observations  
L Shoulder   
ROM/Strength  
AROM PROM Strength  
Shoulder Flexion 163   
5/5  
Shoulder Extension  
Shoulder ABduction   
150 5/5  
Shoulder Int Rotation   
5/5  
Shoulder Ext Rotation   
4/5  
Shoulder Horiz   
ABduction  
Shoulder Horiz   
ADduction  
L Shoulder Scapula   
Strength  
L Shoulder Joint   
Mobility  
Shoulder Special   
Tests  
Outcome Measures  
Outcome Scores  
QuickDASH Total   
Score: 17 QuickDASH   
Disability/Symptom   
Score: 13.64 %  
Treatment  
Therapeutic Activity  
# of Activities: 1  
Therapeutic Activity   
1: Reassessment of   
subjective and   
objective measures,  
reviewed goals and   
POC  
Plan &   
Recommendations  
PT Assessment  
PT Assessment:   
Patient has attended   
1 month of PT for   
left shoulder pain.   
Upon  
reassessment today he   
has made excellent   
progress with both   
left shoulder AROM,  
functional mobility,   
and strength. No pain   
present the last few   
sessions and  
only reports of mild   
tightness and   
soreness in the left   
shoulder joint. Still  
deficient in L ER   
strength however   
patient demonstrates   
ability to continue   
with  
HEP and exercise   
progressions   
independently.   
Reviewed course of   
healing, HEP,  
and self maintenance.   
Patient demonstrates   
excellent   
understanding and is   
in  
agreeance with   
discharge from PT at   
this time.  
Plan  
PT Plan: d/c to   
independent HEP and   
symptom management  
Active  
Patient will report   
return to PLOF with   
upper body dressing.   
(Completed)  
Start: 22   
Expected End:   
22 Met:   
22  
Patient will be   
independent in HEP.   
(Completed)  
Start: 22   
Expected End:   
22 Met:   
22  
Patient will increase   
left shoulder flexion   
AROM to 140 and   
abduction to 130.  
(Completed)  
Start: 22   
Expected End:   
22 Met:   
22  
Patient will increase   
strength to 5/5 in   
LUE. (Progressing)  
Start: 22   
Expected End:   
22  
Goal Note  
Still deficient in L   
ER  
Patient will improve   
Apleys Scratch Test   
to ER: C7, and IR:   
T12 to demo  
improved functional   
mobility. (Completed)  
Start: 22   
Expected End:   
22 Met:   
22  
Functional Outcome   
Measure: Patient will   
improve DASH to 20 or   
<. (Completed)  
Start: 22   
Expected End:   
22 Met:   
22  
Time Entry  
Total Treatment Time  
Start Time: 732  
Stop Time: 752  
Time Calculation   
(min): 20 min  
PT Therapeutic   
Procedures Time Entry  
Therapeutic Activity   
Time Entry: 20  
Misa Vera, PT Normal                                  [facility]  
   
                                                    Progress Noteon 2022   
   
                                        Progress Note       [facility]  
[facility] PT  
[address]  
[facility]   
[id number] [phone]  
PHYSICAL THERAPY  
TREATMENT NOTE  
Patient Name: Waldo Denny  
: 1960  
MRN: [id number]  
Today's Date:   
2022  
Visit Info  
 Legacy case   
data/eval/notes   
remain in Therapy   
Source   
General Visit   
Information  
General  
Chart Reviewed: Yes  
Reason for   
referral/mechanism of   
injury: (from  on   
10/20/22) Patient is   
a 61  
year old male who   
reports shoulder pain   
that has been going   
on for atleast 6  
months. He states it   
started where it is   
very uncomfortable   
and now has really  
caused increased pain   
and is unable to do   
things like   
previously. He had   
his R  
RTC repaired several   
years ago. He had a   
cortisone injection   
10 days ago that  
has greatly helped.   
He states pain is   
mostly on lateral   
aspect of the   
shoulder.  
He describes the pain   
as an aching and then   
will get very sharp   
and feel like a  
catching. He states   
increased difficulty   
with reaching,   
carrying things for  
long periods of time,   
reaching behind his   
back. he states   
increased difficulty  
with driving and   
mainly using his R   
hand. He states   
increased difficulty   
with  
sleeping and states   
wakes up several   
times throughout the   
night.  
General Comments:   
Reports he felt no   
soreness after last   
session. States the  
left shoulder is   
feeling really good.   
No pain currently.  
Pain  
Pain Assessment  
Pain Assessment:   
No/denies pain  
Treatment  
Therapeutic Exercise  
# of Activities: 10  
Therapeutic Exercise   
Activity 1: UBE  
Activity 1 Comment:   
Lvl 2 4' (fwd)  
Therapeutic Exercise   
Activity 2: Ball   
flexion on wall  
Activity 2 Comment:   
RMB 5 x10  
Therapeutic Exercise   
Acitivity 3: ER   
doorway stretch  
Activity 3 Comment:   
30 x2  
Therapeutic Exercise   
Activity 4: AAROM: IR   
w/ towel  
Activity 4 Comment:   
5 x10  
Therapeutic Exercise   
Activity 5: Cable   
column  
Activity 5 Comment:   
rows #35 2x10, SAPD   
#30 2x10, ER #15   
2x10, IR #30 2x10  
Therapeutic Exercise   
Activity 6: Standing   
flex & abd (to 90)  
Activity 6 Comment:   
#2 DB 2x10 ea  
Therapeutic Exercise   
Activity 7: Counter   
push ups plus  
Activity 7 Comment:   
2x10  
Plan &   
Recommendations  
PT Assessment  
PT Assessment:   
Excellent progress   
thus far. Reports no   
increased symptoms or  
soreness following   
last session. Today,   
left shoulder ROM   
with very little  
restrictions.   
Discussed focusing   
therapy sessions on   
strengthening in pain   
free  
ranges. Able to add   
resistance   
strengthening with   
flexion & scaption   
raises as  
well as counter push   
ups with good   
tolerance noted.  
Plan  
PT Plan: discontinue   
AAROM and stretches   
in clinic (he will   
continue in HEP),  
focus on   
strengthening,   
progress as tolerated  
Active  
Patient will report   
return to PLOF with   
upper body dressing.  
Start: 22   
Expected End:   
22  
Patient will be   
independent in HEP.  
Start: 22   
Expected End:   
22  
Patient will increase   
left shoulder flexion   
AROM to 140 and   
abduction to 130.  
Start: 22   
Expected End:   
22  
Patient will increase   
strength to 5/5 in   
LUE.  
Start: 22   
Expected End:   
22  
Patient will improve   
Apleys Scratch Test   
to ER: C7, and IR:   
T12 to demo  
improved functional   
mobility.  
Start: 22   
Expected End:   
22  
Functional Outcome   
Measure: Patient will   
improve DASH to 20 or   
<.  
Start: 22   
Expected End:   
22  
Time Entry  
Start time: 7:30 am  
End time: 8:00 am  
Misa Vera, PT,   
DPT                 [facility]  
   
                                                    CR Shoulder 2+ Views Lefton   
10-   
   
                                                    CR Shoulder 2+ Views   
Left                                    Patient Name: WALDO DENNY  
MRN: [id number]  
Acct#: [id number]  
Diagnostic Radiology  
ACCESSION EXAM   
DATE/TIME PROCEDURE   
ORDERING PROVIDER  
[id number]   
10/10/2022 08:55 EDT   
CR Shoulder 2+ Views   
HODAN ZENDEJAS  
Left  
CPT code  
83283  
Reason For Exam  
(CR Shoulder 2+ Views   
Left) LEFT SHOULDER   
PAIN  
Report  
CLINICAL HISTORY:   
LEFT SHOULDER PAIN  
COMPARISON: None.  
Technique: AP views   
were obtained of the   
left shoulder in   
internal/external  
rotation.   
Additionally,   
position, scapular Y   
view and axillary   
views were  
obtained.  
FINDINGS: Four views   
of the left shoulder   
shows no acute   
fracture or  
dislocation. There   
are mild degenerative   
changes of the   
acromioclavicular  
joint. The alignment   
is anatomic. The   
visualized lungs are   
clear.  
IMPRESSION:  
No acute osseous   
abnormality of the   
left shoulder.  
Mild degenerative   
changes of the   
acromioclavicular   
joint.  
Report Dictated on   
Workstation:   
[id number]  
***** Final *****  
  
  
Dictating Physician:   
MD COLON YUN ROBERT  
Signed Date and Time:   
10/10/2022 3:10 pm  
Signed by: MD COLON YUN ROBERT  
Transcribed Date and   
Time: 10/10/2022 3:11 [facility]  
   
                                                    XR Shoulder Left 2 VWon 10-1  
0-2022   
   
                                                            Patient Name:  AWLDO DENNY  
  
  
MRN: [id number]  
  
  
Acct#: [id number]  
  
  
Diagnostic Radiology  
  
  
ACCESSION EXAM   
DATE/TIME PROCEDURE   
ORDERING PROVIDER  
[id number]   
10/10/2022 08:55 EDT   
CR Shoulder 2+ Views   
HODAN ZENDEJAS  
Left  
  
  
CPT code  
02112  
  
  
Reason For Exam  
(CR Shoulder 2+ Views   
Left) LEFT SHOULDER   
PAIN  
  
Report  
CLINICAL HISTORY:   
LEFT SHOULDER PAIN  
  
COMPARISON: None.  
  
Technique: AP views   
were obtained of the   
left shoulder in   
internal/external  
rotation.   
Additionally,   
position, scapular Y   
view and axillary   
views were  
obtained.  
  
FINDINGS: Four views   
of the left shoulder   
shows no acute   
fracture or  
dislocation. There   
are mild degenerative   
changes of the   
acromioclavicular  
joint. The alignment   
is anatomic. The   
visualized lungs are   
clear.  
  
IMPRESSION:  
No acute osseous   
abnormality of the   
left shoulder.  
  
Mild degenerative   
changes of the   
acromioclavicular   
joint.  
Report Dictated on   
Workstation:   
[id number]  
  
---** Final ---**  
  
  
Dictating Physician:   
MD COLON YUN ROBERT  
Signed Date and Time:   
10/10/2022 3:10 pm  
Signed by: MD COLON YUN ROBERT  
Transcribed Date and   
Time: 10/10/2022 3:11                                         [facility]  
   
                                                            Palmira Colon MD  
   
- 10/10/2022   
Formatting of this   
note might be   
different from the   
original.  
Patient Name: WALDO DENNY  
  
  
MRN: [id number]  
  
  
Acct#: [id number]  
  
  
Diagnostic Radiology  
  
  
ACCESSION EXAM   
DATE/TIME PROCEDURE   
ORDERING PROVIDER  
[id number]   
10/10/2022 08:55 EDT   
CR Shoulder 2+ Views   
HODAN ZENDEJAS  
Left  
  
  
CPT code  
[id number]  
  
  
Reason For Exam  
(CR Shoulder 2+ Views   
Left) LEFT SHOULDER   
PAIN  
  
Report  
CLINICAL HISTORY:   
LEFT SHOULDER PAIN  
  
COMPARISON: None.  
  
Technique: AP views   
were obtained of the   
left shoulder in   
internal/external  
rotation.   
Additionally,   
position, scapular Y   
view and axillary   
views were  
obtained.  
  
FINDINGS: Four views   
of the left shoulder   
shows no acute   
fracture or  
dislocation. There   
are mild degenerative   
changes of the   
acromioclavicular  
joint. The alignment   
is anatomic. The   
visualized lungs are   
clear.  
  
IMPRESSION:  
No acute osseous   
abnormality of the   
left shoulder.  
  
Mild degenerative   
changes of the   
acromioclavicular   
joint.  
Report Dictated on   
Workstation:   
[id number]  
  
---** Final ---**  
  
  
Dictating Physician:   
MD DARLENE, PALMIRA PETTIT  
Signed Date and Time:   
10/10/2022 3:10 pm  
Signed by: MD COLON YUN ROBERT  
Transcribed Date and   
Time: 10/10/2022 3:11  
                                                            [facility]  
Work Phone:   
[phone]  
   
                                                    Radiology Study   
observation   
(narrative)                                                     [facility]  
Work Phone:   
[phone]  
   
                                                    XR Shoulder Left 2 VWOrdered  
 By: Palmira Colon on 10-   
   
                                                                  [facility]  
Work Phone:   
[phone]  
   
                                                    Basic Metabolic Panelon 03-0  
   
   
                      Anion gap [Moles/Vol] 10 mmol/L                        [place], [place]  
   
                          Calcium [Mass/Vol] 9.0 mg/dL                 8.4 - 10.  
4   
mg/dL                                   [place], [place]  
   
                          Chloride [Moles/Vol] 106 mmol/L                98 - 10  
7   
mmol/L                                  [place]  
   
                          CO2 [Moles/Vol] 25 mmol/L                 22 - 30   
mmol/L                                  [place]  
   
                          Creatinine [Mass/Vol] 0.97 mg/dL                0.52 -  
 1.25   
mg/dL                                   [place]  
   
                                                    EGFR IF NonAfrican   
American        >60.0                           >60 mL/min      [place]  
   
                                        Comment on above:   Source- MDRD equatio  
n with creatinine calibration to   
IDMS(NKDEP)  
eGFR not recommended for drug dose adjustment  
  
   
   
                                                    GFR/1.73 sq M   
predicted among   
blacks MDRD (S/P/Bld)   
[Vol rate/Area] mL/min/{1.73_m2}                 >60 mL/min      [place]  
   
                          Glucose [Mass/Vol] 99 mg/dL                  70 - 100   
mg/dL                                   [place]  
   
                                        Comment on above:   Moderately hemolysed  
, interpret with caution.   
   
                                                    Interpretation and   
review of laboratory   
results         Abnormal                                        [place]  
   
                          Potassium [Moles/Vol] 5.5 mmol/L   High         3.5 -   
5.1   
mmol/L                                  [place]  
   
                                        Comment on above:   Moderately hemolysed  
, interpret with caution.   
   
                          Sodium [Moles/Vol] 141 mmol/L                135 - 145  
   
mmol/L                                  [place]  
   
                                                    Urea nitrogen   
[Mass/Vol]      18 mg/dL                        7 - 20 mg/dL    [place]  
   
                                                            Test Performed by   
[facility],   
[address] [place] [address] [address],   
[place], [place] [address]                                         [place], [place]  
   
                                                    CBC Auto Differentialon 03-0  
   
   
                          Absolute Baso # 0.1 10*3/uL               0 - 0.2   
10*3/uL                                 [place]  
   
                          Absolute Neut # 2.2 10*3/uL               1.8 - 7   
10*3/uL                                 [place]  
   
                                                    Basophils/100 WBC   
(Bld)           1.2 %                           0 - 2 %         [place]  
   
                                                    Eosinophils (Bld)   
[#/Vol]             0.3 10*3/uL                             0 - 0.5   
10*3/uL                                 [place]  
   
                                                    Eosinophils/100 WBC   
(Bld)           6.3 %           High            1 - 6 %         [place]  
   
                                                    Erythrocyte   
distribution width   
(RBC) [Ratio]   13.7 %                          11.5 - 14.5 %   [place]  
   
                                                    Granulocytes/100 WBC   
(Bld)           45.1 %                          40 - 80 %       [place]  
   
                                                    Hematocrit (Bld)   
[Volume fraction] 41.3 %                          40 - 52 %       [place]  
   
                                                    Hemoglobin (Bld)   
[Mass/Vol]      13.9 g/dL                       13 - 18 g/dL    [place]  
   
                                                    Interpretation and   
review of laboratory   
results         Abnormal                                        [place]  
   
                                                    Lymphocytes (Bld)   
[#/Vol]             1.7 10*3/uL                             1 - 4.3   
10*3/uL                                 [place]  
   
                                                    Lymphocytes/100 WBC   
(Bld)           35.3 %                          20 - 40 %       [place]  
   
                                                    MCH (RBC) [Entitic   
mass]           31.8 pg                         26 - 34 pg      [place]  
   
                      MCHC (RBC) [Mass/Vol] 33.6 %                32 - 36 %  [place]  
[place]  
   
                                                    MCV (RBC) [Entitic   
vol]            94.5 fL                         80 - 98 fL      [place]  
   
                                                    Monocytes (Bld)   
[#/Vol]             0.6 10*3/uL                             0 - 0.8   
10*3/uL                                 [place]  
   
                                                    Monocytes/100 WBC   
(Bld)           12.1 %          High            2 - 10 %        [place]  
   
                                                    Platelet mean volume   
(Bld) [Entitic vol] 9.2 fL                          7.4 - 10.4 fL   [place], [place]  
   
                                                    Platelets (Bld)   
[#/Vol]             263 10*3/uL                             140 - 440   
10*3/uL                                 [place]  
   
                          RBC (Bld) [#/Vol] 4.37 10*6/uL Low          4.4 - 5.9   
10*6/uL                                 [place]  
   
                          WBC (Bld) [#/Vol] 4.9 10*3/uL               3.6 - 10.7  
   
10*3/uL                                 [place]  
   
                                                            Test Performed by   
[address]  
   
                                                    NM Cardiac Stress Test Nucle  
ar Imagingon 2020   
   
                                                            [place]pike, [place] [facility] Incoming   
Cardiology Results   
From [facility] -   
2020 11:32 AM   
EST Nuclear Stress   
Myocardial Perfusion   
Study  
Lg Protocol  
  
Gated SPECT  
  
Patient: Waldo Denny Height: (70 in)  
MRN: [id number] Weight:   
(241.6 lb)  
: 1960  
Age: 59  
Gender: M  
Study Date:   
2020  
Accession#:  
Patient Room #:  
  
*ORDERING PHYSICIAN:   
* Marito Gutiérrez  
*SUPERVISING   
PHYSICIAN: *   
Cliff Camacho   
*RN: * Kira Baird  
*NUCLEAR TECH: *   
Jenelle Caballero  
*READING PHYSICIAN: *   
Amari Hinkle MD, [facility]  
  
---------------------  
--------  
Indications: Chest   
pain.  
  
---------------------  
--------  
Summary:  
  
1. Normal exercise   
stress myocardial   
perfusion imaging. No   
evidence for  
scar or ischemia.  
2. Gated SPECT: The   
calculated left   
ventricular ejection   
fraction  
during stress is 65%.  
3. Stress ECG   
conclusions: The   
stress ECG is normal.   
[facility] scoring:  
exercise time of 7.5   
min; maximum ST   
deviation of 0 mm; no   
angina;  
resulting score is 8.   
This score predicts a   
low risk of cardiac  
events.  
  
---------------------  
--------  
History: Tobacco use   
current. Medications:   
Aspirin. Allergies:   
No  
known allergies.   
Family history of   
cardiovascular   
disease. Medication  
list reviewed with   
patient and no   
contraindicated   
medications have been  
taken. Hemoglobin,   
potassium and/or   
troponin x2 are   
within policy  
guidelines.  
  
---------------------  
--------  
Study data: The   
patient's lungs are   
clear to   
auscultation. Heart  
auscultation by RN   
revealed a regular   
rate and rhythm. Pre   
pain  
assessment is 0 out   
of 10. Post pain   
assessment is 0 out   
of 10.  
Patient status:   
Observation. Gated   
SPECT; rest/stress.   
Two-day  
Sestamibi. Consent:   
The procedure was   
reviewed with the   
patient and  
the patient voices   
understanding. Study   
completion: The   
patient  
tolerated the   
procedure well. There   
were no   
complications.  
Administered   
medications: None.   
Discharge: Discharge   
instruction  
given. The patient   
was transferred to a   
regular nursing   
floorvia  
wheelchair.  
  
---------------------  
--------  
Procedure data:   
Initial setup. The   
patient was brought   
to the  
laboratory. A   
baseline ECG was   
recorded. Surface ECG   
leads and blood  
pressure measurements   
were monitored. IV   
access obtained 20   
RAC. IV  
access per floor RN.   
IV patent, site   
benign. Treadmill   
exercise testing  
was performed using   
the Lg protocol.   
The patient exercised   
for 7 min  
34 sec, to a maximal   
work rate of 9.4   
mets. Exercise was   
terminated due  
to moderate fatigue.   
Exercise was   
terminated when the   
patient's Miguel Angel  
scale was 17.  
  
---------------------  
--------  
Baseline ECG:   
Isolated ventricular   
ectopy. Normal sinus   
rhythm with  
right bundle branch   
block.  
Stress protocol:  
  
+-------------+---+--  
----------+----------  
--+  
+Stage +HR +BP   
+Symptoms +  
+-------------+---+--  
----------+----------  
--+  
+Rest +66 +140/90   
(107)+No symptoms.+  
+-------------+---+--  
----------+----------  
--+  
+Peak stress   
+133+140/90   
(107)+------------+  
+-------------+---+--  
----------+----------  
--+  
+Recovery +101+130/80   
(97) +------------+  
+-------------+---+--  
----------+----------  
--+  
+Late recovery+95   
+135/75 (95)   
+------------+  
+-------------+---+--  
----------+----------  
--+  
Stress results: Peak   
heart rate during   
stress was 133 bpm.   
(82% of  
maximal predicted   
heart rate). The   
maximal predicted   
heart rate was 163  
bpm.The target heart   
rate was achieved.   
The heart rate   
recovery at one  
minute is normal. The   
heart rate at 1   
minute into recovery   
was 101 bpm.  
The heart rate   
response to stress is   
normal. There is an   
appropriate  
response to stress.   
The rate-pressure   
product for the peak   
heart rate  
and blood pressure   
was 91555 mm Hg/min.  
Stress ECG: There are   
no stress arrhythmias   
or conduction  
abnormalities.   
Isolated ventricular   
ectopy. The stress   
ECG is normal.  
[facility] scoring:   
exercise time of 7.5   
min; maximum ST   
deviation of 0 mm;  
no angina; resulting   
score is 8. This   
score predicts a low   
risk of  
cardiac events.   
Anterior precordial   
leads are   
uninterpretable due   
to  
right bundle branch   
block. Upsloping ST   
depression: <0.5 mm.  
Isotope   
administration:  
  
+--------------+-----  
---------------------  
--+----------------+  
+Stage +Stress +Rest   
+  
+--------------+-----  
---------------------  
--+----------------+  
+Agent +Tc-99m   
sestamibi +Tc-99m   
sestamibi+  
+--------------+-----  
---------------------  
--+----------------+  
+Injected dose +15.7   
mCi +15.3 mCi +  
+--------------+-----  
---------------------  
--+----------------+  
+Date +2020   
+2020 +  
+--------------+-----  
---------------------  
--+----------------+  
+Injection time+10:33   
AM +08:05 AM +  
+--------------+-----  
---------------------  
--+----------------+  
+Injection at +1 min   
before end of   
exercise+------------  
----+  
+--------------+-----  
---------------------  
--+----------------+  
+Route +IV +IV +  
+--------------+-----  
---------------------  
--+----------------+  
+Injected by +JACQUELIN CABALLERO, RAINER +B   
MAGNUS, RAINER +  
+--------------+-----  
---------------------  
--+----------------+  
Image properties:   
Imaging information:   
The study was gated.   
The  
patient was imaged in   
the supine   
position.The image   
quality was good.  
Myocardial perfusion   
imaging: Left   
ventricular size is   
normal. The  
TID ratio is 0.67.  
Rest: LV regional   
perfusion: Mildly   
reduced perfusion of   
the apical  
inferior myocardium.   
Perfusion score: 1.  
  
Stress: LV regional   
perfusion: Mildly   
reduced perfusion of   
the apical  
inferior myocardium.   
Perfusion score: 1.   
Defect is small and   
mild, does  
not meet quantitative   
criteria.  
Gated SPECT: The left   
ventricular   
end-diastolic volume   
is 110 ml. The  
left ventricular   
end-systolic volume   
is 39 ml. The   
calculated left  
ventricular ejection   
fraction during   
stress is 65 %. No LV   
regional  
wall motion   
abnormalities.  
  
Electronically signed   
by  
Amari Hinkle MD, [facility]  
2020 11:31  
                                                            [place], [place]  
   
                                                            Nuclear Stress   
Myocardial Perfusion   
Study  
Lg Protocol  
  
Gated SPECT  
  
Patient: Waldo Denny Height: (70 in)  
MRN: [id number] Weight:   
(241.6 lb)  
: 1960  
Age: 59  
Gender: M  
Study Date:   
2020  
Accession#:  
Patient Room #:  
  
*ORDERING PHYSICIAN:   
* Marito Gutiérrez  
*SUPERVISING   
PHYSICIAN: *   
Cliff Camacho   
*RN: Kira Frank  
*NUCLEAR TECH: *   
Jenelle Caballero  
*READING PHYSICIAN: *   
Amari Hinkle MD, [facility]  
  
---------------------  
--------  
Indications: Chest   
pain.  
  
---------------------  
--------  
Summary:  
  
1. Normal exercise   
stress myocardial   
perfusion imaging. No   
evidence for  
scar or ischemia.  
2. Gated SPECT: The   
calculated left   
ventricular ejection   
fraction  
during stress is 65%.  
3. Stress ECG   
conclusions: The   
stress ECG is normal.   
[facility] scoring:  
exercise time of 7.5   
min; maximum ST   
deviation of 0 mm; no   
angina;  
resulting score is 8.   
This score predicts a   
low risk of cardiac  
events.  
  
---------------------  
--------  
History: Tobacco use   
current. Medications:   
Aspirin. Allergies:   
No  
known allergies.   
Family history of   
cardiovascular   
disease. Medication  
list reviewed with   
patient and no   
contraindicated   
medications have been  
taken. Hemoglobin,   
potassium and/or   
troponin x2 are   
within policy  
guidelines.  
  
---------------------  
--------  
Study data: The   
patient's lungs are   
clear to   
auscultation. Heart  
auscultation by RN   
revealed a regular   
rate and rhythm. Pre   
pain  
assessment is 0 out   
of 10. Post pain   
assessment is 0 out   
of 10.  
Patient status:   
Observation. Gated   
SPECT; rest/stress.   
Two-day  
Sestamibi. Consent:   
The procedure was   
reviewed with the   
patient and  
the patient voices   
understanding. Study   
completion: The   
patient  
tolerated the   
procedure well. There   
were no   
complications.  
Administered   
medications: None.   
Discharge: Discharge   
instruction  
given. The patient   
was transferred to a   
regular nursing   
floorvia  
wheelchair.  
  
---------------------  
--------  
Procedure data:   
Initial setup. The   
patient was brought   
to the  
laboratory. A   
baseline ECG was   
recorded. Surface ECG   
leads and blood  
pressure measurements   
were monitored. IV   
access obtained 20   
RAC. IV  
access per floor RN.   
IV patent, site   
benign. Treadmill   
exercise testing  
was performed using   
the Lg protocol.   
The patient exercised   
for 7 min  
34 sec, to a maximal   
work rate of 9.4   
mets. Exercise was   
terminated due  
to moderate fatigue.   
Exercise was   
terminated when the   
patient's Miguel Angel  
scale was 17.  
  
---------------------  
--------  
Baseline ECG:   
Isolated ventricular   
ectopy. Normal sinus   
rhythm with  
right bundle branch   
block.  
Stress protocol:  
  
+-------------+---+--  
----------+----------  
--+  
+Stage  +HR +BP   
+Symptoms +  
+-------------+---+--  
----------+----------  
--+  
+Rest +66 +140/90   
(107)+No symptoms.+  
+-------------+---+--  
----------+----------  
--+  
+Peak stress   
+133+140/90   
(107)+------------+  
+-------------+---+--  
----------+----------  
--+  
+Recovery +101+130/80   
(97) +------------+  
+-------------+---+--  
----------+----------  
--+  
+Late recovery+95   
+135/75 (95)   
+------------+  
+-------------+---+--  
----------+----------  
--+  
Stress results: Peak   
heart rate during   
stress was 133 bpm.   
(82% of  
maximal predicted   
heart rate). The   
maximal predicted   
heart rate was 163  
bpm.The target heart   
rate was achieved.   
The heart rate   
recovery at one  
minute is normal. The   
heart rate at 1   
minute into recovery   
was 101 bpm.  
The heart rate   
response to stress is   
normal. There is an   
appropriate  
response to stress.   
The rate-pressure   
product for the peak   
heart rate  
and blood pressure   
was 32312 mm Hg/min.  
Stress ECG: There are   
no stress arrhythmias   
or conduction  
abnormalities.   
Isolated ventricular   
ectopy. The stress   
ECG is normal.  
[facility] scoring:   
exercise time of 7.5   
min; maximum ST   
deviation of 0 mm;  
no angina; resulting   
score is 8. This   
score predicts a low   
risk of  
cardiac events.   
Anterior precordial   
leads are   
uninterpretable due   
to  
right bundle branch   
block. Upsloping ST   
depression: <0.5 mm.  
Isotope   
administration:  
  
+--------------+-----  
---------------------  
--+----------------+  
+Stage +Stress +Rest   
+  
+--------------+-----  
---------------------  
--+----------------+  
+Agent +Tc-99m   
sestamibi +Tc-99m   
sestamibi+  
+--------------+-----  
---------------------  
--+----------------+  
+Injected dose +15.7   
mCi +15.3 mCi +  
+--------------+-----  
---------------------  
--+----------------+  
+Date  +2020   
+2020 +  
+--------------+-----  
---------------------  
--+----------------+  
+Injection time+10:33   
AM  +08:05 AM +  
+--------------+-----  
---------------------  
--+----------------+  
+Injection at +1 min   
before end of   
exercise+------------  
----+  
+--------------+-----  
---------------------  
--+----------------+  
+Route +IV +IV +  
+--------------+-----  
---------------------  
--+----------------+  
+Injected by +RAINER CASTANEDA +RAINER CASTANEDA +  
+--------------+-----  
---------------------  
--+----------------+  
Image properties:   
Imaging information:   
The study was gated.   
The  
patient was imaged in   
the supine   
position.The image   
quality was good.  
Myocardial perfusion   
imaging: Left   
ventricular size is   
normal. The  
TID ratio is 0.67.  
Rest: LV regional   
perfusion: Mildly   
reduced perfusion of   
the apical  
inferior myocardium.   
Perfusion score: 1.  
  
Stress: LV regional   
perfusion: Mildly   
reduced perfusion of   
the apical  
inferior myocardium.   
Perfusion score: 1.   
Defect is small and   
mild, does  
not meet quantitative   
criteria.  
Gated SPECT: The left   
ventricular   
end-diastolic volume   
is 110 ml. The  
left ventricular   
end-systolic volume   
is 39 ml. The   
calculated left  
ventricular ejection   
fraction during   
stress is 65 %. No LV   
regional  
wall motion   
abnormalities.  
  
Electronically signed   
by  
Amari Hinkle MD, FACC  
2020 11:31  
  
                                                            [place], [place]  
   
                                                    [facility]Con 2020   
   
                                                    Erythrocyte   
distribution width   
(RBC) [Ratio]   13.5 %                          11.5 - 14.5 %   [place]  
   
                                                    Hematocrit (Bld)   
[Volume fraction] 41.1 %                          40 - 52 %       [place]  
   
                                                    Hemoglobin (Bld)   
[Mass/Vol]      13.7 g/dL                       13 - 18 g/dL    [place]  
   
                                                    Interpretation and   
review of laboratory   
results         Abnormal                                        [place]  
   
                                                    MCH (RBC) [Entitic   
mass]           31.3 pg                         26 - 34 pg      [place]  
   
                      MCHC (RBC) [Mass/Vol] 33.2 %                32 - 36 %  [place], [place]  
   
                                                    MCV (RBC) [Entitic   
vol]            94.2 fL                         80 - 98 fL      [place]  
   
                                                    Platelet mean volume   
(Bld) [Entitic vol] 8.4 fL                          7.4 - 10.4 fL   [place], [place]  
   
                                                    Platelets (Bld)   
[#/Vol]             189 10*3/uL                             140 - 440   
10*3/uL                                 [place]  
   
                          RBC (Bld) [#/Vol] 4.37 10*6/uL Low          4.4 - 5.9   
10*6/uL                                 [place]  
   
                          WBC (Bld) [#/Vol] 4.8 10*3/uL               3.6 - 10.7  
   
10*3/uL                                 [place]  
   
                                                            Test Performed by   
[facility],   
[address],   
[place], [place] [id number] [place] [address]  
   
                                                    D-Dimer, Quantitativeon    
   
                      D-Dimer, Quant 0.22 mg/L             0 - 0.5 mg/L [place], [place]  
   
                                        Comment on above:   [employer] D-Dimer va  
lues of <0.50 mg/L FEU can be used in  
combination with a pre-test probability model (e.g. Well's)  
to exclude pulmonary embolism (PE) disease, as well as an  
aid in the diagnosis of deep vein thrombosis (DVT).  
  
   
   
                                                            Test Performed by   
[facility],   
[address],   
[place], [place] [id number] [place] [address]  
   
                                                    EKG 12 Lead - Chest Painon 0  
3-   
   
                                                            [facility] [employer] [facility]   
Test Date: 2020   
Pat Name: Waldo Denny   
Department: MRN:   
[id number] Room: 246   
Gender: M Technician:   
adam : 1960   
Requested By: SAQIB WHALEN Order   
Number: [id number]   
Reading MD: Jay Ortiz Measurements   
Intervals Axis Rate:   
63 P: 29 DC: 184 QRS:   
14 QRSD: 116 T: -7   
QT: 392  QTc: 402   
Interpretive   
Statements SINUS   
RHYTHM INCOMPLETE   
RIGHT BUNDLE BRANCH   
BLOCK Electronically   
Signed On 3-6-2020   
13:10:29 EST by   
Jay [facility], [place]  
   
                                                            Spike, [facility] Incoming   
Cardiology Results   
From [facility] -   
2020 1:11 PM   
EST [facility]  
  
Test Date: 2020  
Pat Name: Waldo Denny   
Department:  
MRN: [id number] Room:   
246  
Gender: M Technician:   
adam  
: 1960   
Requested By: SAQIB WHALEN  
Order Number:   
[id number] Reading MD:   
Jay Ortiz  
Measurements  
Intervals Axis  
Rate: 63 P: 29  
DC: 184 QRS: 14  
QRSD: 116 T: -7  
QT: 392  
QTc: 402  
Interpretive   
Statements  
SINUS RHYTHM  
INCOMPLETE RIGHT   
BUNDLE BRANCH BLOCK  
Electronically Signed   
On 3-6-2020 13:10:29   
EST by Jay Ortiz                                         [facility], [place]  
   
                                                    EKG 12 leadon 2020   
   
                                                            [facility]   
Test Date: 2020   
Pat Name: Waldo Denny   
Department: 2A2E MRN:   
[id number] Room: 246   
Gender: M Technician:   
GREY : 1960   
Requested By: MARITO GUTIÉRREZ Order Number:   
[id number] Reading MD:   
Amari Hinkle   
Measurements   
Intervals Axis Rate:   
59 P:  19 DC: 196   
QRS: 28 QRSD: 112 T:   
-6 QT: 400 QTc: 397    
Interpretive   
Statements SINUS   
RHYTHM NONSPECIFIC   
INTRAVENTRICULAR   
CONDUCTION DELAY   
Electronically Signed   
On 3-6-2020 14:12:28   
EST by Amari Hinkle                                            [facility], [place]  
   
                                                            Spike, [facility] Incoming   
Cardiology Results   
From [employer] -   
2020 2:13 PM   
EST [facility] [employer]   
System  
  
Test Date: 2020  
Pat Name: Waldo Denny   
Department: 2A2E  
MRN: [id number] Room:   
246  
Gender: M Technician:   
GREY  
: 1960   
Requested By: MARITO GUTIÉRREZ  
Order Number:   
[id number] Reading MD:   
Amari Hinkle  
Measurements  
Intervals Axis  
Rate: 59 P: 19  
DC: 196 QRS: 28  
QRSD: 112 T: -6  
QT: 400  
QTc: 397  
Interpretive   
Statements  
SINUS RHYTHM  
NONSPECIFIC   
INTRAVENTRICULAR   
CONDUCTION DELAY  
Electronically Signed   
On 3-6-2020 14:12:28   
EST by Amari Hinkle                                            [place], [place]  
   
                                                    Lipid Panelon 2020   
   
                                                    Cholesterol   
[Mass/Vol]      157 mg/dL                       <200            [place]  
   
                                                    Cholesterol in HDL   
[Mass/Vol]      36 mg/dL        Low             40 - 60 mg/dL   [place]  
   
                                                    Cholesterol in LDL   
[Mass/Vol]      96 mg/dL                        <100            [place]  
   
                                                    Cholesterol.total/Cho  
lesterol in HDL [Mass   
ratio]          4 {ratio}                                       [place]  
   
                                        Comment on above:   Ref Range:  
< 3 Low Risk for CHD  
3-6 Mod Risk for CHD  
> 6 High Risk for CHD  
  
   
   
                                                    Interpretation and   
review of laboratory   
results         Abnormal                                        [place]  
   
                                                    Triglyceride   
[Mass/Vol]      125 mg/dL                       <150            [place]  
   
                                                            Test Performed by   
[facility] [employer] [facility],   
[address] [place], [place] [address]                                         [place]  
   
                                                    Troponinon 2020   
   
                                                    Troponin I.cardiac   
[Mass/Vol]          ng/mL                                   0 - 0.034   
ng/mL                                   [place]  
   
                                        Comment on above:   .   
   
                                                            Test Performed by   
[facility] [employer] [facility],   
[address] [place] [address]                                         [place]  
   
                                                    Troponin I.cardiac   
[Mass/Vol]          ng/mL                                   0 - 0.034   
ng/mL                                   [place]  
   
                                        Comment on above:   .   
   
                                                            Test Performed by   
[facility] [employer] [facility],   
[address] [place], [place] [address]                                         [place]  
   
                                                    Basic Metabolic Panelon    
   
                      Anion gap [Moles/Vol] 9 mmol/L                         [place], [place]  
   
                          Calcium [Mass/Vol] 9.0 mg/dL                 8.4 - 10.  
4   
mg/dL                                   [place]  
   
                          Chloride [Moles/Vol] 104 mmol/L                98 - 10  
7   
mmol/L                                  [place]  
   
                          CO2 [Moles/Vol] 27 mmol/L                 22 - 30   
mmol/L                                  [place]  
   
                          Creatinine [Mass/Vol] 1.18 mg/dL                0.52 -  
 1.25   
mg/dL                                   [place]  
   
                                                    EGFR IF NonAfrican   
American        >60.0                           >60 mL/min      [place]  
   
                                        Comment on above:   Source- MDRD equatio  
n with creatinine calibration to   
IDMS(NKDEP)  
eGFR not recommended for drug dose adjustment  
  
   
   
                                                    GFR/1.73 sq M   
predicted among   
blacks MDRD (S/P/Bld)   
[Vol rate/Area] mL/min/{1.73_m2}                 >60 mL/min      [place]  
   
                          Glucose [Mass/Vol] 101 mg/dL    High         70 - 100   
mg/dL                                   [place]  
   
                                                    Interpretation and   
review of laboratory   
results         Abnormal                                        [place]  
   
                          Potassium [Moles/Vol] 4.1 mmol/L                3.5 -   
5.1   
mmol/L                                  [place]  
   
                          Sodium [Moles/Vol] 139 mmol/L                135 - 145  
   
mmol/L                                  [place]  
   
                                                    Urea nitrogen   
[Mass/Vol]      17 mg/dL                        7 - 20 mg/dL    [place]  
   
                                                            Test Performed by   
[facility],   
[address] [place], [place] [address] ,   
[address]  
   
                                                    Hemogram (CBC) w/Auto Diffon  
 2020   
   
                          Absolute Baso # 0.0 10*3/uL               0 - 0.2   
10*3/uL                                 [place]  
   
                          Absolute Neut # 2.8 10*3/uL               1.8 - 7   
10*3/uL                                 [place]  
   
                                                    Basophils/100 WBC   
(Bld)           0.8 %                           0 - 2 %         [place]  
   
                                                    Eosinophils (Bld)   
[#/Vol]             0.3 10*3/uL                             0 - 0.5   
10*3/uL                                 [place]  
   
                                                    Eosinophils/100 WBC   
(Bld)           5.2 %                           1 - 6 %         [place]  
   
                                                    Erythrocyte   
distribution width   
(RBC) [Ratio]   13.6 %                          11.5 - 14.5 %   [place]  
   
                                                    Granulocytes/100 WBC   
(Bld)           47.2 %                          40 - 80 %       [place]  
   
                                                    Hematocrit (Bld)   
[Volume fraction] 42.2 %                          40 - 52 %       [place]  
   
                                                    Hemoglobin (Bld)   
[Mass/Vol]      14.1 g/dL                       13 - 18 g/dL    [place]  
   
                                                    Interpretation and   
review of laboratory   
results         Abnormal                                        [place]  
   
                                                    Lymphocytes (Bld)   
[#/Vol]             2.1 10*3/uL                             1 - 4.3   
10*3/uL                                 [place]  
   
                                                    Lymphocytes/100 WBC   
(Bld)           35.4 %                          20 - 40 %       [place]  
   
                                                    MCH (RBC) [Entitic   
mass]           31.4 pg                         26 - 34 pg      [place]  
   
                      MCHC (RBC) [Mass/Vol] 33.3 %                32 - 36 %  [place]  
[place]  
   
                                                    MCV (RBC) [Entitic   
vol]            94.3 fL                         80 - 98 fL      [place]  
   
                                                    Monocytes (Bld)   
[#/Vol]             0.7 10*3/uL                             0 - 0.8   
10*3/uL                                 [place]  
   
                                                    Monocytes/100 WBC   
(Bld)           11.4 %          High            2 - 10 %        [place]  
   
                                                    Platelet mean volume   
(Bld) [Entitic vol] 8.9 fL                          7.4 - 10.4 fL   [place]  
   
                                                    Platelets (Bld)   
[#/Vol]             243 10*3/uL                             140 - 440   
10*3/uL                                 [place]  
   
                          RBC (Bld) [#/Vol] 4.48 10*6/uL              4.4 - 5.9   
10*6/uL                                 [place]  
   
                          WBC (Bld) [#/Vol] 5.9 10*3/uL               3.6 - 10.7  
   
10*3/uL                                 [place]  
   
                                                            Test Performed by   
[facility] [employer] [facility],   
[address] ,   
[address]  
   
                                                    Troponin x1on 2020   
   
                                                    Troponin I.cardiac   
[Mass/Vol]          ng/mL                                   0 - 0.034   
ng/mL                                   [place], [place]  
   
                                        Comment on above:   .   
   
                                                            Test Performed by   
[facility] [employer] [facility],   
[address] ,   
[address]  
   
                                                    XR CHEST PORTABLEon 20   
   
                                                            Patient Name: WALDO DENNY MRN: [id number]   
FIN: [id number]   
---Diagnostic   
Radiology--- Exam   
Date/Time 2020   
17:59:13 EST Exam CR   
Chest Portable   
Ordering Physician   
MD WHALEN VIJAY   
Accession Number   
[id number] CPT4   
Codes 22444 () Reason   
For Exam chest pain   
Report PORTABLE CHEST   
CLINICAL INDICATION:   
Chest pain TECHNIQUE:   
Portable AP   
COMPARISON: 2016   
FINDINGS: Exam   
quality: EKG leads   
obscure small   
portions of the   
chest. The heart and   
mediastinum are   
normal. The lungs are   
clear. Costophrenic   
angles are sharp. The   
osseous structures   
are unremarkable.   
IMPRESSION: No acute   
abnormality Report   
Dictated on   
Workstation:   
[id number] ---** Final   
---** Dictating   
Physician: MD ARVIZU JEFFREY Signed Date   
and Time: 2020   
6:13 pm Signed by:   
MD ARVIZU JEFFREY   
Transcribed Date and   
Time: 2020 6:14                                         [place], [place]  
   
                                                            Spike, Petera Incoming   
Radiology Results   
From [facility] -   
2020 6:14 PM   
EST  
  
  
Patient Name: WALDO DENNY  
MRN: [id number]  
FIN: [id number]  
  
  
---Diagnostic   
Radiology---  
  
Exam Date/Time   
2020 17:59:13   
EST  
Exam CR Chest   
Portable  
Ordering Physician   
MD WHALEN VIJAY  
Accession Number   
[id number]  
  
CPT4 Codes  
05740 ()  
  
  
Reason For Exam  
chest pain  
  
  
Report  
PORTABLE CHEST  
  
CLINICAL INDICATION:   
Chest pain  
  
TECHNIQUE: Portable   
AP  
  
COMPARISON: 2016  
  
FINDINGS:  
Exam quality: EKG   
leads obscure small   
portions of the   
chest.  
  
The heart and   
mediastinum are   
normal. The lungs are   
clear. Costophrenic  
angles  
are sharp. The   
osseous structures   
are unremarkable.  
  
IMPRESSION: No acute   
abnormality  
  
  
Report Dictated on   
Workstation:   
[id number]  
---** Final ---**  
  
  
Dictating Physician:   
MD ARVIZU JEFFREY  
Signed Date and Time:   
2020 6:13 pm  
Signed by: MD ARVIZU JEFFREY  
Transcribed Date and   
Time: 2020 6:14                                         [facility] [place]  
   
                                                    Clinical Summary: HMSPatient  
IDon 2019   
   
                      account number [id number] [facility]   
Clinic  
Work Phone:   
[phone]  
   
                                                    Clinical Summary: Scanned Hi  
story Summaryon 2019   
   
                                                    adl form etoh alcohol   
performance     Beer                                            [facility]  
Work Phone:   
[phone]  
   
                                                    consumes three or   
more drinks of   
alcohol (beer, wine,   
liquor) daily or   
almost daily                            less than 1 drink per   
day                                                         [facility]  
Work Phone:   
[phone]  
   
                                                    Data entered by   
patient exercise   
frequency       4 days per week                                 [facility]  
Work Phone:   
[phone]  
   
                                                    Data entered by   
patient exercise type                   walkingstrength   
training                                                    [facility]  
Work Phone:   
[phone]  
   
                                                    data entered by   
patient, alcohol   
(ethanol or ETOH) use Yes                                             [facility]  
Work Phone:   
[phone]  
   
                                                    Data entered by   
patient, allergy list                   Plant Pollens (Hay   
Fever)                                                      [facility]  
Work Phone:   
[phone]  
   
                                                    data entered by   
patient, drug (of   
abuse) use      No                                              [facility]  
Work Phone:   
[phone]  
   
                                                    data entered by   
patient, Employer   
Name            Employed                                        [facility]  
Work Phone:   
[phone]  
   
                                                    data entered by   
patient, exercise   
history         Yes                                             [facility]  
Work Phone:   
[phone]  
   
                                                    Data entered by   
patient, history of   
past surgeries                          Hernia repairShoulder   
surgery other                                               [facility]  
Work Phone:   
[phone]  
   
                                                    data entered by   
patient, mother's   
medical history                         Heart diseaseHigh   
blood pressureKidney   
disease                                                     [facility]  
Work Phone:   
[phone]  
   
                                                    data entered by   
patient, past medical   
history         High blood pressure                                 [facility]  
Work Phone:   
[phone]  
   
                                                    data entered by   
patient, social   
history, current   
smoker          former smoker                                   [facility]  
Work Phone:   
[phone]  
   
                                                    data entered by   
patient, social   
history, marital   
status                                                   [facility]  
Work Phone:   
[phone]  
   
                                                    father of patient is   
alive or                                          [facility]  
Work Phone:   
[phone]  
   
                                                    Housing Type:   
apartment, house,   
shelter, trailer,   
none            House                                           [facility]  
Work Phone:   
[phone]  
   
                                                    housing unit size   
(asthma environmental   
history, housing)   
(from single family   
to don't know)  2 Floors                                        [facility]  
Work Phone:   
[phone]  
   
                                                    mother of patient is   
alive or  Alive                                           [facility]  
Work Phone:   
[phone]  
   
                                                    Number of dependent   
children        No                                              [facility]  
Work Phone:   
[phone]  
   
                                                    sister of patient(s)   
alive or  Unknown                                         [facility]  
Work Phone:   
[phone]  
   
                                                    Clinical Summary: Scanned RO  
S Summaryon 2019   
   
                      endocrine ROS Denies                           [facility]  
lanny   
[facility]  
Work Phone:   
[phone]  
   
                                                    genitourinary review   
of systems, E&M Denies                                          [facility]  
Work Phone:   
[phone]  
   
                                                    Lymphocytes (Bld)   
[#/Vol]         Denies                                          [facility]  
Work Phone:   
[phone]  
   
                                                    ROS cardiovascular   
E&M             Denies                                          [facility]  
Work Phone:   
[phone]  
   
                      ROS ENT E&M Denies                           [facility]  
c   
[facility]  
Work Phone:   
[phone]  
   
                                                    ROS gastrointestinal   
E&M             Denies                                          [facility]  
Work Phone:   
[phone]  
   
                      ROS general E&M Denies                           [facility]  
linic   
[facility]  
Work Phone:   
[phone]  
   
                                                    ROS Musculoskeletal   
comments        Joint Pain                                      [facility]  
Work Phone:   
[phone]  
   
                                                    ROS musculoskeletal   
E&M             Complains                                       [facility]  
Work Phone:   
[phone]  
   
                      ROS neurological E&M Denies                           [facility]  
[facility]  
Work Phone:   
[phone]  
   
                      ROS psychiatric E&M Denies                           [facility]  
[facility] [place]  
Work Phone:   
[phone]  
   
                      ROS pulmonary E&M Denrula                           [facility]  
Work Phone:   
[phone]  
   
                      ROS skin E&M Denrula                           [facility]  
Work Phone:   
[phone]  
   
                                                    Office Visit: New - 1st visi  
t with practice, Rm: 2on 2019   
   
                                                    NEGATED: Highlighted   
rowTobacco smoking   
status NHIS                             Tobacco smoking   
status NHIS                                                 [facility]  
Work Phone:   
[phone]  
  
  
  
Vital Signs  
  
  
                          Date Time    Vital Sign   Value        Performing   
Clinician                               Facility  
   
                                                    2023   
14:          Body height         177.8 cm            Dara Slabaugh   
PA-C  
Work Phone:   
[phone]                          [facility]  
   
                                                    2023   
14:          Body temperature    98.2 [degF]         Dara Slabaugh   
PA-C  
Work Phone:   
[phone]                          [facility]  
   
                                                    2023   
14:          Body weight         112.04 kg           Dara Slabaugh   
PA-C  
Work Phone:   
[phone]                          [facility]  
   
                                                    2023   
14:                              Diastolic blood   
pressure                  76 mm[Hg]                 Dara Slabaugh   
PA-C  
Work Phone:   
[phone]                          [facility]  
   
                                                    2023   
14:          Heart rate          76 /min             Dara Slabaugh   
PA-C  
Work Phone:   
[phone]                          [facility]  
   
                                                    2023   
14:          Respiratory rate    16 /min             Dara Slabaugh   
PA-C  
Work Phone:   
[phone]                          [facility]  
   
                                                    2023   
14:                              SaO2% (BldA) [Mass   
fraction]                 96 %                      Dara Slabaugh   
PA-C  
Work Phone:   
[phone]                          [facility]  
   
                                                    2023   
14:                              Systolic blood   
pressure                  133 mm[Hg]                Dara Slabaugh   
PA-C  
Work Phone:   
[phone]                          [facility]  
   
                                                    2020   
08:      Body Temperature 97.11 [degF]    Saqib Whalen [place]  
   
                                                    2020   
08:      BP Diastolic    83 mm[Hg]       Saqib Whalen [facility]  
[place]   
[place]  
   
                                                    2020   
08:      BP Systolic     123 mm[Hg]      Saqib Whalen [facility]  
[place],   
[place]  
   
                                                    2020   
08:      Pulse (Heart Rate) 60 /min         [facility]  
[place]   
[place]  
   
                                                    2020   
08:      Pulse Oximetry  95 %            Saqib Angel [facility]  
[place]   
[place]  
   
                                                    2020   
08:      Respiratory Rate 16 /min         Saqib Whalen [facility]   
[place],   
[place]  
   
                                                    2020   
06:      Body weight     110.04 kg       Saqib Whalen [facility]  
[place]   
[place]  
   
                                                    NEGATED:   
Highlighted   
   
14:                              BMI (Body Mass   
Index)              34.99 kg/m2         Masha Valenzuela LEIDA      [facility]  
Work Phone:   
[phone]  
   
                                                    NEGATED:   
Highlighted   
   
14:      Body weight     110.22 kg       Masha Valenzuela [facility]  
Work Phone:   
[phone]  
   
                                                    NEGATED:   
Highlighted   
   
14:      Body weight     110 kg          Masha Valenzuela LEIDA  [facility]  
Work Phone:   
[phone]  
   
                                                    NEGATED:   
Highlighted   
   
14:      BP Diastolic    80 mm[Hg]       Masha Valenzuela LEIDA  [facility]  
Work Phone:   
[phone]  
   
                                                    NEGATED:   
Highlighted   
   
14:      BP Systolic     134 mm[Hg]      Masha Valenzuela LPN  [facility]  
Work Phone:   
[phone]  
   
                                                    NEGATED:   
Highlighted   
   
14:      Heart rate      2+              Masha Valenzuela LEIDA  [facility]  
Work Phone:   
[phone]  
   
                                                    NEGATED:   
Highlighted   
   
14:      Height          177.8 cm        Masha Valenzuela LPN  [facility]  
Work Phone:   
[phone]  
   
                                                    NEGATED:   
Highlighted   
   
14:      Height          178 cm          Masha Valenzuela LPN  [facility]  
Work Phone:   
[phone]  
   
                                                    NEGATED:   
Highlighted   
   
14:      Pulse (Heart Rate) 67 /min         Masha Valenzuela LPN  [facility]  
Work Phone:   
[phone]  
  
  
  
Encounters  
  
  
                          Encounter Date Encounter Type Care Provider Facility  
   
                                                    Start: 10-  
End: 10-     ambulatory          MINNIE PATEL            Facility:[facility]  
   
                                                    Start: 2023  
End: 2023     ambulatory          JET NEIL   Facility:[facility]  
   
                                                    Start: 2023  
End: 2023                         Patient encounter   
procedure                               Dara Maria PA-C  
Work Phone:   
[phone]                          [facility]   
[facility]  
   
                                        Comment on above:   Right acute suppurat  
jackeline otitis media (Primary Dx);  
Acute otitis externa of right ear, unspecified type   
   
                                                    Start: 2022  
End: 11-     ambulatory          [facility]  
   
                                                    Start: 2022  
End: 2022     ambulatory          [facility]  
   
                                Start: 2022 Transcribe Orders Hodan pina MD  
Work Phone:   
[phone]                          [facility]  
   
                                        Comment on above:   Strain of other musc  
les, fascia and tendons at shoulder and   
upper   
arm level, left arm, initial encounter (Primary Dx)   
   
                                                    Start: 2022  
End: 2022     ambulatory          [facility]  
   
                          Start: 10- ambulatory   UNKNOWN PROVIDER [facility]  
   
                                                    Start: 10-  
End: 10-                         Subsequent hospital   
visit by physician                      Hodan Zendejas MD  
Work Phone:   
[phone]                          [facility]  
   
                                Start: 10- Transcribe Orders Hodan pina MD  
Work Phone:   
[phone]                          [facility]   
[facility]  
   
                                        Comment on above:   Strain of other musc  
les, fascia and tendons at shoulder and   
upper   
arm level, left arm, initial encounter (Primary Dx)   
   
                                                    Start: 10-  
End: 10-                         Subsequent hospital   
visit by physician                      Hodan Zendejas MD  
Work Phone:   
[phone]                          [facility]  
   
                          Start: 10- ambulatory   UNKNOWN PROVIDER [facility]  
   
                                                    Start: 10-  
End: 10-                         Subsequent hospital   
visit by physician                      Hodan Zendejas MD  
Work Phone:   
[phone]                          [facility]  
   
                                                    Start: 2020  
End: 2020                         Emergency department   
patient visit                           Saqib Whalen  
Work Phone:   
[phone]                          [facility] TELE[facility]  
   
                                        Comment on above:   Chest pain, unspecif  
ied type (Primary Dx)   
   
                                                    Start: 2019  
End: 2019                         Patient encounter   
procedure                               Yair Calix MD  
Work Phone:   
[phone]                          [facility]  
Work Phone:   
[phone]  
  
  
  
Procedures  
  
  
                          Date         Procedure    Procedure Detail Performing   
Clinician  
   
                                        Start: 10-   Radex shoulder compl  
ete   
minimum 2 views                                     Hodan Zendejas MD  
Work Phone:   
[phone]  
   
                                        Start: 2020   Basic metabolic pane  
l   
calcium total                                       Kulwinder Hernandez  
Work Phone:   
[phone]  
   
                                        Start: 2020   Blood count complete  
   
auto&auto difrntl wbc                               Kulwinder David  
Work Phone:   
[phone]  
   
                                        Start: 2020   Myocardial spect   
multiple studies                                    Marito Gutiérrez  
Work Phone:   
[phone]  
   
                                        Start: 2020   Ecg routine ecg w/le  
ast   
12 lds w/i&r                                        Marito Gutiérrez  
Work Phone:   
[phone]  
   
                                        Start: 2020   Assay of troponin   
quantitative                                        Marito Gutiérrez  
Work Phone:   
[phone]  
   
                                        Start: 2020   Blood count complete  
   
automated                                           Marito Gutiérrez  
Work Phone:   
[phone]  
   
                          Start: 2020 Lipid panel               Marito lane  
Work Phone:   
[phone]  
   
                                        Start: 2020   Lipid 1996 panel - S  
madhu   
or Plasma                                           Hodan Zendejas MD  
Work Phone:   
[phone]  
   
                                        Start: 2020   Assay of troponin   
quantitative                                        Marito Gutiérrez  
Work Phone:   
[phone]  
   
                                        Start: 2020   Fibrin dgradj produc  
ts   
d-dimer quantitative                                Marito ROSA Brock  
Work Phone:   
[phone]  
   
                                        Start: 2020   Radiologic exam ches  
t   
single view                                         Saqib Adusumilli  
Work Phone:   
[phone]  
   
                                        Start: 2020   Assay of troponin   
quantitative                                        Saqib Adusumilli  
Work Phone:   
[phone]  
   
                                        Start: 2020   Basic metabolic pane  
l   
calcium total                                       Saqib Adusumilli  
Work Phone:   
[phone]  
   
                                        Start: 2020   Blood count complete  
   
auto&auto difrntl wbc                               Saqib Adumilli  
Work Phone:   
[phone]  
   
                                        Start: 2020   Ecg routine ecg w/le  
ast   
12 lds w/i&r                                        Saqib Adusumilli  
Work Phone:   
[phone]  
   
                                                    Start: 2019  
End: 2019                         Blood pressure within   
normal parameters - no   
follow-up required                                  Yair Calix MD  
Work Phone:   
[phone]  
   
                                                    Start: 2019  
End: 2019                         BMI documented as above   
normal parameters -   
follow-up documented                                Yair Calix MD  
Work Phone:   
[phone]  
   
                                                    Start: 2019  
End: 2019                         Documentation of current   
medications                                         Yair Calix MD  
Work Phone:   
[phone]  
   
                                                    Start: 2019  
End: 2019                         Pain assessment   
documented as positive -   
follow-up documented                                Yair Calix MD  
Work Phone:   
[phone]  
   
                                                    Start: 2019  
End: 2019                         Radiologic exam knee   
complete 4/more views                               Yair Calix MD  
Work Phone:   
[phone]  
   
                                                    Start: 2019  
End: 2019     Tobacco non-user                        Yair Calix MD  
Work Phone:   
[phone]  
   
                                                    NEGATED: Highlighted   
rowStart: 2019  
End: 2019                         Documentation of current   
medications                                         Masha Valenzuela LPN  
  
  
  
Plan of Treatment  
  
  
                          Date         Care Activity Detail       Author  
   
                          Start: 2025 Lipid panel               [facility]  
   
                          Start: 2023 DEPRESSION ASSESSMENT DEPRESSION ASS  
ESSMENT [facility]  
   
                          Start: 2022 Influenza vaccination              C  
[facility]  
   
                          Start: 2022 Influenza vaccination Flu vaccine (#  
1) [facility]  
   
                                        Start: 2022   COVID-19 VACCINE (4   
-   
Booster for Moderna   
series)                                 COVID-19 VACCINE (4 -   
Booster for Moderna   
series)                                 [facility]  
   
                                        Start: 2021   PROSTATE CANCER   
SCREENING DISCUSSION                    PROSTATE CANCER SCREENING   
DISCUSSION                              [facility]  
   
                          Start: 2021 Creatinine monitoring Creatinine mon  
[place], [place]  
   
                          Start: 2021 Potassium monitoring Potassium monit  
[place], [place]  
   
                          Start: 2019 Influenza vaccination Flu vaccine (#  
1) [place]  
   
                                                    Start: 2019  
End: 2019     Appointment         Appointment         [facility]  
Work Phone:   
[phone]  
   
                                        Start: 2012   Shingles vaccine (1   
of   
2)                        Shingles vaccine (1 of 2) [facility]  
   
                                        Start: 2012   SHINGRIX VACCINE (1   
of   
2)                        SHINGRIX VACCINE (1 of 2) [facility]  
   
                                        Start: 2012   Zoster Vaccines (1 o  
f   
2)                        Zoster Vaccines (1 of 2)  [facility]  
   
                          Start: 2007 COLOGUARD (FIT-DNA) COLOGUARD (FIT-D  
NA) [facility]  
   
                          Start: 2007 Colonoscopy  COLONOSCOPY  [facility]  
   
                                        Start: 2007   COLORECTAL CANCER   
SCREENING                               COLORECTAL CANCER   
SCREENING                               [facility]  
   
                          Start: 2007 CT COLONOGRAPHY CT COLONOGRAPHY [facility]  
   
                          Start: 2007 DIABETES SCREEN DIABETES SCREEN [facility]  
   
                          Start: 2007 FECAL OCCULT BLOOD FECAL OCCULT BLOO  
D [facility]  
   
                                        Start: 2007   Screening for malign  
ant   
neoplasm of colon                                   [facility]  
   
                          Start: 2007 SIGMOIDOSCOPY SIGMOIDOSCOPY [facility]  
   
                          Start: 1997 LIPID SCREEN LIPID SCREEN [facility]  
   
                                        Start: 1981   DTaP/Tdap/Td vaccine  
 (1   
- Tdap)                                 DTaP/Tdap/Td vaccine (1 -   
Tdap)                                   [facility]  
   
                                        Start: 1981   DTaP/Tdap/Td Vaccine  
s   
(1 - Tdap)                              DTaP/Tdap/Td Vaccines (1   
- Tdap)                                 [facility]  
   
                                        Start: 1981   Urine microalbumin   
profile                   DTAP,TDAP,TD (1 - Tdap)   [facility]  
   
                          Start: 1980 Hepatitis C screening              S  
[facility]  
   
                          Start: 1980 HEPATITIS C SCREENING HEPATITIS C SC  
NEGRONING [facility]  
   
                          Start: 1980 HIV SCREENING HIV SCREENING [facility]  
   
                          Start: 1977 HIV screening HIV screen   [facility]  
   
                          Start: 1974 Depression Screen Depression Screen   
[facility]  
   
                                        Start: 1968   Pneumococcal 0-64 ye  
ars   
Vaccine (1 - PCV)                       Pneumococcal 0-64 years   
Vaccine (1 - PCV)                       [facility]  
   
                                        Start: 1963   MMR Vaccines (1 of 1  
 -   
Standard series)                        MMR Vaccines (1 of 1 -   
Standard series)                        [facility]  
   
                          Start: 1963 COVID-19 Vaccine (#1) COVID-19 Vacci  
ne (#1) [facility]  
   
                                        Start: 1962   Hepatitis B Vaccines  
 (1   
of 3 - 3-dose series)                   Hepatitis B Vaccines (1   
of 3 - 3-dose series)                   [facility]  
   
                          Start: 1962 HIV screening HIV Screening [facility]  
   
                                        Start: 1962   Screening for malign  
ant   
neoplasm of colon                                   [facility]  
   
                                                            ECHO Complete 2D W   
Doppler W Color                         ECHO Complete 2D W   
Doppler W Color   
Echocardiography Routine   
2020 8:06 AM EST                  [facility], [place]  
   
                                                EKG 12 lead     EKG 12 lead ECG   
Routine   
As Needed until   
discontinued starting   
2020                              [facility] [place]  
   
                                        Comment on above:   As Needed until disc  
ontinued starting 2020   
   
                                                            Initiate Oxygen Ther  
apy   
Protocol                                Initiate Oxygen Therapy   
Protocol Respiratory Care   
Routine Daily until   
discontinued starting   
2020                              [facility]  
   
                                        Comment on above:   Daily until disconti  
nued starting 2020   
   
                                                      
End: 2020           Nasal Cannula Oxygen      Nasal Cannula Oxygen   
Respiratory Care Routine   
As Needed until   
discontinued starting   
2020                              [place]  
   
                                        Comment on above:   As Needed until disc  
ontinued starting 2020   
  
  
  
Payers  
  
  
                          Date         Payer Category Payer        Policy ID  
   
                          2023   Unknown                   [id number]  
   
                          2023   Unknown                   [id number]  
   
                          10-   Unknown                   [id number]  
[id number]  
   
                                2019      Unknown         [employer] [place] [place]  
PO   
[id number]   
2019-Present   
[phone] [address]   
[place] [address]                       [id number]   
[id number]  
[id number]  
   
                          2016   Unknown                     
   
                          2016   Unknown                   [id number]  
   
                          1962   Unknown                   [id number]   
[id number]  
   
                          1962   Unknown                   [id number]   
[id number]  
  
  
  
Social History  
  
  
                          Date         Type         Detail       Facility  
   
                                        Start: 2020   Tobacco smoking   
status NHIS                             Current every day   
smoker                                  [place]  
   
                                                      
End: 2020                         History of tobacco   
use                       Cigarette Smoker          [place]  
   
                                                    Start: 2020  
End: 2022     Alcohol intake      [facility]  
   
                                                    Start: 2020  
End: 10-     Tobacco Comment     4 cigarrettes a day [place]  
   
                                        Start: 1962   Sex Assigned At   
Birth                     Not on file               [place]  
   
                                                    Start: 04-  
End: 10-                         Tobacco smoking   
status NHIS               Ex-smoker                 [facility]  
   
                                                      
End: 2020                         History of tobacco   
use                       Current smoker            [facility]  
Work Phone:   
[phone]  
   
                                                    Start: 04-  
End: 10-                         Tobacco use and   
exposure                                Smokeless tobacco   
non-user                                [facility]  
Work Phone:   
[phone]  
   
                                Start: 2021 Alcohol intake  Current non-dr  
 of   
alcohol (finding)                       [facility]  
   
                                                    NEGATED: Highlighted   
rowStart: 2019  
End: 2019     Alcohol use         Alcohol use         [facility]  
Work Phone:   
[phone]  
   
                                                    NEGATED: Highlighted   
rowStart: 2019  
End: 2019                         Details of drug   
misuse behavior                         Details of drug misuse   
behavior                                [facility]  
Work Phone:   
[phone]  
   
                                                    NEGATED: Highlighted   
rowStart: 2019  
End: 2019     Assertion           Former smoker       [facility]  
Work Phone:   
[phone]  
   
                                                    NEGATED: Highlighted   
rowStart: 2019  
End: 2019                         How many days of   
moderate to   
strenuous exercise,   
like a brisk walk,   
did you do in the   
last 7 days?                            How many days of   
moderate to strenuous   
exercise, like a brisk   
walk, did you do in   
the last 7 days?                        [facility]  
Work Phone:   
[phone]  
  
  
  
Progress note 10-  
  
  
                                Note Date & Type Note            Facility  
   
                                        10- Note     HNO ID: [id number]  
Author: Minnie Patel APRN.CNP  
Service: ?  
Author Type: Nurse Practitioner  
Type: Progress Notes  
Filed: 10/17/2023 4:10 PM  
Note Text:  
This note was created using ColorChip.  
Subjective  
Waldo Denny is a 62 year old male.  
HPI by patient:  
Waldo Denny is a 62 year old presenting   
to the office with the complaint  
of viral symptoms.  
Started approximately  8 days  prior.  
Associated symptoms include congestion,   
sore throat, congestion, fatigue,  
and  itchy  ears.  
Denies fever, body aches, gi symptoms,   
headache.  
Covid Immunization Dates  
Overdue - Covid-19 Vaccine (   
season) Overdue since 2021 Imm Admin: COVID-19 original   
vaccine, full dose, monovalent  
(MODERNA)  
2021 Imm Admin: COVID-19 original   
vaccine, full dose, monovalent  
(MODERNA)  
2020 Imm Admin: COVID-19 original   
vaccine, full dose, monovalent  
(MODERNA)  
Sick contacts: none.  
Smoking history/second hand smoke: none.  
OTC not helping.  
No antibiotic use in the last 60 days.  
ALLERGIES  
No Known Allergies  
No family history on file.  
Social History  
Tobacco Use  
Smoking status: Former  
Smokeless tobacco: Never  
Alcohol use: No  
Drug use: No  
Active Ambulatory Problems  
No Active Ambulatory Problems  
Resolved Ambulatory Problems  
No Resolved Ambulatory Problems  
Past Medical History:  
No date: Hypertension  
Review of Systems  
Constitutional: Positive for fatigue.   
Negative for fever.  
HENT: Positive for congestion and sore   
throat.  
Eyes: Negative.  
Respiratory: Positive for cough.  
Cardiovascular: Negative.  
Gastrointestinal: Negative.  
Endocrine: Negative.  
Genitourinary: Negative.  
Musculoskeletal: Negative.  
Skin: Negative.  
Neurological: Negative.  
Objective  
/70   Pulse 63   Temp 37 ?C (98.6   
?F)   Wt 115.7 kg (255 lb)    
SpO2 97%   BMI 36.59 kg/m?  
Physical Exam  
Vitals reviewed.  
Constitutional:  
General: He is awake. He is not in acute   
distress.  
Appearance: He is not ill-appearing,   
toxic-appearing or diaphoretic.  
HENT:  
Head: Normocephalic and atraumatic.  
Right Ear: Tympanic membrane, ear canal   
and external ear normal.  
Left Ear: Ear canal and external ear   
normal. There is impacted cerumen.  
Nose: Nose normal.  
Right Sinus: No maxillary sinus   
tenderness or frontal sinus tenderness.  
Left Sinus: No maxillary sinus tenderness   
or frontal sinus tenderness.  
Mouth/Throat:  
Mouth: Mucous membranes are moist.  
Pharynx: Oropharynx is clear. No   
pharyngeal swelling, oropharyngeal  
exudate or posterior oropharyngeal   
erythema.  
Cardiovascular:  
Rate and Rhythm: Normal rate and regular   
rhythm.  
Pulmonary:  
Effort: Pulmonary effort is normal.  
Breath sounds: Normal breath sounds.  
Lymphadenopathy:  
Head:  
Right side of head: No submandibular or   
tonsillar adenopathy.  
Left side of head: No submandibular or   
tonsillar adenopathy.  
Cervical: No cervical adenopathy.  
Neurological:  
Mental Status: He is alert.  
Psychiatric:  
Behavior: Behavior is cooperative.  
Assessment and Plan  
(J02.9) Pharyngitis, unspecified etiology   
(primary encounter diagnosis)  
Plan: STREP A MOLECULAR (POC)  
(J06.9) Upper respiratory infection with   
cough and congestion  
Plan: amoxicillin-clavulanic acid   
(AUGMENTIN) 875-125  
mg per tablet, fluticasone (FLONASE   
ALLERGY  
RELIEF) 50 mcg/actuation nasal spray,  
fexofenadine (ALLEGRA ALLERGY) 180 mg   
tablet,  
COVID AND INFLUENZA A/B NAAT, ROUTINE  
(L29.9) Itching of ear  
Plan: fexofenadine (ALLEGRA ALLERGY) 180   
mg tablet  
Education on viral vs bacterial   
infections. Most viral infections will  
last 10 days, sometimes 14.  
It is possible to have back to back viral   
infections. An antibiotic will  
not treat a virus.  
-Covid/flu test for rule out, results in   
48 hours, isolation in the  
interim. Result to mychart.  
-States symptoms for about 8 days, will   
have patient try Augmentin.  
Remember if no improvement with   
antibiotic that this is likely viral and  
needs to run it's course.  
-Drink lots of fluids and get plenty of   
rest. Gargle with salt water 3  
times/day. OTC antihistamine.  
-Vaporizers, cool mist humidifiers, warm   
showers, and warm fluids help  
open respiratory and sinus passages.   
Clean humidifiers daily.  
-OTC tylenol/ibuprofen as directed on the   
bottle.  
-Saline nasal spray as needed. Flonase   
twice daily can help reduce  
inflammation through the sinus cavities.  
The patient will pursue further   
outpatient evaluation with the primary  
care physician or another Urgent   
Care/Express Care as outlined in the  
after visit summary. The patient is   
agreeable to this plan of care and  
follow-up instructions have been   
explained in detail. The patient has  
received these instructions in written   
format and have expressed an  
understanding of the after visit summary.  
Medical Decision Making:  
Level: 4 - Moderate  
I spent a total of 20 minutes on the date   
of the service which included  
preparing to see the patient,   
face-to-face patient care, completing  
clinical d (more content not included)... [facility]  
  
  
  
Progress note 2023  
  
  
                                Note Date & Type Note            Facility  
   
                                        2023 Note     HNO ID: [id number]  
Author: Dara Maria PA-C  
Service: ?  
Author Type: Physician Assistant  
Type: Progress Notes  
Filed: 2023 3:13 PM  
Note Text:  
2023  
Patient presents with:  
Ear Problem: Left ear pain started a few   
days ago.  
SUBJECTIVE: This is a 62 year old that is   
here today for 1 day history of  
left ear pain.  
Hurts to put his hearing aids in the left   
ear, but it also feels like  
there is pain is deeper inside the ear   
too.  
He has been congested over the past week   
or so with a cold.  
He has a h/o TM patch to the left ear and   
so, states that  We have to be  
careful what ear drops I put in there.    
He does not know what his ENT  
deems  safe.   
No dizziness, vertigo, changes in   
hearing, or drainage.  
He denies any recent swimming or use of   
q-tips, ear plugs, or ear buds.  
No n/v/d, HA, or rash.  
Denies fever, chills, sweats, or fatigue.  
Patient denies wheezing, shortness of   
breath, increased WOB, or chest  
pain.  
No other URI symptoms.  
No other sick symptoms.  
Pain on scale of 0-10 with 0 being no   
pain and 10 being greatest pain: 6  
Nothing makes the symptoms better.   
Nothing makes them worse.  
Self-treatment:. none  
The severity is mild and the symptoms are   
not improving.  
The patient did not have a similar   
problem in the last 3 months.  
The patient did not take any antibiotics   
in the last 3 months.  
Barriers to learning: none.  
Reviewed meds, OTCs, herbals or   
supplements.  
Reviewed allergies, medications, social   
history, and past medical history.  
PAST MEDICAL HISTORY  
Diagnosis Date  
Hypertension  
ALLERGIES Patient has no known allergies.  
MEDICATIONS  
Current Outpatient Medications  
Medication Sig  
amoxicillin (AMOXIL) 875 mg tablet Take 1   
tablet by mouth twice daily for  
7 days.  
ofloxacin (FLOXIN) 0.3 % otic solution   
Use 10 Drops in the left ear once  
daily for 7 days. IN LEFT EAR ONLY  
candesartan (ATACAND) 16 mg tablet Take   
16 mg by mouth once daily.  
No current facility-administered   
medications for this visit.  
Medications and allergies reviewed by   
this provider.  
SOCIAL HISTORY  
Social History  
Tobacco Use  
Smoking status: Former  
Smokeless tobacco: Never  
Substance Use Topics  
Alcohol use: No  
Drug use: No  
REVIEW OF SYSTEMS  
Review of Systems  
ROS: constitutional-neg, HENT-ear ache,   
Eyes- neg, heart-neg,  
respiratory-neg, skin-neg, lymph-neg,   
Allergy- neg, neuro-neg, - All  
systems neg except as noted above in HPI.  
OBJECTIVE:  
/76   Pulse 76   Temp 36.8 ?C (98.2   
?F) (Tympanic)   Resp 16    
Ht 177.8 cm (5' 10 )   Wt 112 kg (247 lb)   
  SpO2 96%   BMI 35.44 kg/m?  
. Vital signs reviewed by this provider.  
Physical Exam  
Constitutional:  
General: He is not in acute distress.  
Appearance: Normal appearance. He is   
well-developed and normal weight.  
He is not ill-appearing, toxic-appearing   
or diaphoretic.  
HENT:  
Head: Normocephalic and atraumatic. No   
right periorbital erythema or  
left periorbital erythema.  
Salivary Glands: Right salivary gland is   
not diffusely enlarged or  
tender. Left salivary gland is not   
diffusely enlarged or tender.  
Right Ear: Tympanic membrane, ear canal   
and external ear normal.  
Left Ear: Ear canal and external ear   
normal. Tenderness present. No  
drainage or swelling. A middle ear   
effusion is present. Tympanic membrane  
is injected and erythematous.  
Ears:  
Comments: Left ear: TM has scarring/patch   
and is erythematous,  
injected, and taut with dull effusion.  
+wax in the base of the canal- but TM   
still visible.  
+tragus tenderness to pumping  
Ear nelson is erythematous and tender to   
the speculum.  
No edema or drainage.  
Nose: Nose normal. No congestion or   
rhinorrhea.  
Right Sinus: No maxillary sinus   
tenderness or frontal sinus tenderness.  
Left Sinus: No maxillary sinus tenderness   
or frontal sinus tenderness.  
Mouth/Throat:  
Lips: No lesions.  
Mouth: Mucous membranes are moist. No   
oral lesions.  
Dentition: No gum lesions.  
Tongue: No lesions. Tongue does not   
deviate from midline.  
Palate: No mass and lesions.  
Pharynx: Oropharynx is clear. No   
pharyngeal swelling, oropharyngeal  
exudate, posterior oropharyngeal erythema   
or uvula swelling.  
Tonsils: No tonsillar exudate or   
tonsillar abscesses.  
Eyes:  
General: Lids are normal. No scleral   
icterus.  
Right eye: No discharge.  
Left eye: No discharge.  
Extraocular Movements: Extraocular   
movements intact.  
Conjunctiva/sclera: Conjunctivae normal.  
Pupils: Pupils are equal, round, and   
reactive to light.  
Cardiovascular:  
Rate and Rhythm: Normal rate and regular   
rhythm.  
Heart sounds: Normal heart sounds.  
Pulmonary:  
Effort: Pulmonary effort is normal.  
Breath sounds: Normal breath sounds and   
air entry.  
Musculoskeletal:  
Cervical back: Full passive range of   
motion without pain. No spinous  
process tenderness or muscular   
tenderness.  
Lymphadenopathy:  
Head:  
Right side of head: No submental,   
submandibular, tonsillar,  
preauricular or posterior auricular   
adenopathy.  
Left side of head: No submen (more   
content not included)...                [facility]  
  
  
  
Instructions 2023  
                              Patient Instructions  
  
                                Note Date & Type Note            Facility  
   
                                        2023 Instructions   
  
  
Dara Maria PA-C - 2023 3:05 PM   
ESTFormatting of this note might be   
different from the original.  
ASSESSMENT/PLAN:  
1. Right acute suppurative otitis media -  
  
- AMOXICILLIN 875 MG TABLET  
  
2. Acute otitis externa of right ear,   
unspecified type -  
  
- OFLOXACIN 0.3 % EAR DROPS  
Call ENT and make sure this is an ok drop  
  
Encourage fluids, rest.  
Tylenol and Motrin for pain  
Take entire course of Antibiotics.  
  
Call PCP if sx worsen or no better.  
If symptoms worsen, or new symptoms develop   
go to ER.  
If you have worsening of breathing or   
breathing changes- go to ER.  
If you have persistent fever unrelieved by   
Tylenol/Motrin- go to the ER.  
Discussed all red flag symptoms and reasons   
to go to ER  
No further questions.  
Follow up as needed.  
The patient verbalizes understanding and is   
in agreement with plan of care.  
Barriers to learning: none.  
  
Dara Maria PA-C  
  
Electronically signed by Dara Maria PA-C at 2023 3:05 PM EST  
  
documented in this encounter            [facility]  
  
  
  
History of Present illness Narrative 2023  
                Dara Maria PA-C - 2023 3:04 PM EST  
  
                                Note Date & Type Note            Facility  
   
                                                    2023 History of Presen  
t   
illness Narrative                       Formatting of this note is different   
from the original.  
2023  
Patient presents with:  
Ear Problem: Left ear pain started a   
few days ago.  
  
SUBJECTIVE: This is a 62 year old   
that is here today for 1 day history   
of left ear pain.  
  
Hurts to put his hearing aids in the   
left ear, but it also feels like   
there is pain is deeper inside the   
ear too.  
He has been congested over the past   
week or so with a cold.  
  
He has a h/o TM patch to the left   
ear and so, states that  We have to   
be careful what ear drops I put in   
there.  He does not know what his   
ENT deems  safe.   
  
No dizziness, vertigo, changes in   
hearing, or drainage.  
He denies any recent swimming or use   
of q-tips, ear plugs, or ear buds.  
  
No n/v/d, HA, or rash.  
Denies fever, chills, sweats, or   
fatigue.  
Patient denies wheezing, shortness   
of breath, increased WOB, or chest   
pain.  
No other URI symptoms.  
No other sick symptoms.  
  
Pain on scale of 0-10 with 0 being   
no pain and 10 being greatest pain:   
6  
Nothing makes the symptoms better.   
Nothing makes them worse.  
Self-treatment:. none  
The severity is mild and the   
symptoms are not improving.  
The patient did not have a similar   
problem in the last 3 months.  
The patient did not take any   
antibiotics in the last 3 months.  
Barriers to learning: none.  
Reviewed meds, OTCs, herbals or   
supplements.  
Reviewed allergies, medications,   
social history, and past medical   
history.  
  
PAST MEDICAL HISTORY  
Diagnosis Date  
Hypertension  
  
ALLERGIES Patient has no known   
allergies.  
MEDICATIONS  
Current Outpatient Medications  
Medication Sig  
amoxicillin (AMOXIL) 875 mg tablet   
Take 1 tablet by mouth twice daily   
for 7 days.  
ofloxacin (FLOXIN) 0.3 % otic   
solution Use 10 Drops in the left   
ear once daily for 7 days. IN LEFT   
EAR ONLY  
candesartan (ATACAND) 16 mg tablet   
Take 16 mg by mouth once daily.  
  
No current facility-administered   
medications for this visit.  
  
Medications and allergies reviewed   
by this provider.  
SOCIAL HISTORY  
Social History  
  
Tobacco Use  
Smoking status: Former  
Smokeless tobacco: Never  
Substance Use Topics  
Alcohol use: No  
Drug use: No  
  
REVIEW OF SYSTEMS  
  
Review of Systems  
ROS: constitutional-neg, HENT-ear   
ache, Eyes- neg, heart-neg,   
respiratory-neg, skin-neg,   
lymph-neg, Allergy- neg, neuro-neg,   
- All systems neg except as noted   
above in HPI.  
  
OBJECTIVE:  
/76   Pulse 76   Temp 36.8 C   
(98.2 F) (Tympanic)   Resp 16   Ht   
177.8 cm (5' 10 )   Wt 112 kg (247   
lb)   SpO2 96%   BMI 35.44 kg/m .   
Vital signs reviewed by this   
provider.  
  
Physical Exam  
Constitutional:  
General: He is not in acute   
distress.  
Appearance: Normal appearance. He is   
well-developed and normal weight. He   
is not ill-appearing,   
toxic-appearing or diaphoretic.  
HENT:  
Head: Normocephalic and atraumatic.   
No right periorbital erythema or   
left periorbital erythema.  
Salivary Glands: Right salivary   
gland is not diffusely enlarged or   
tender. Left salivary gland is not   
diffusely enlarged or tender.  
Right Ear: Tympanic membrane, ear   
canal and external ear normal.  
Left Ear: Ear canal and external ear   
normal. Tenderness present. No   
drainage or swelling. A middle ear   
effusion is present. Tympanic   
membrane is injected and   
erythematous.  
Ears:  
Comments: Left ear: TM has   
scarring/patch and is erythematous,   
injected, and taut with dull   
effusion.  
+wax in the base of the canal- but   
TM still visible.  
+tragus tenderness to pumping  
Ear nelson is erythematous and tender   
to the speculum.  
No edema or drainage.  
Nose: Nose normal. No congestion or   
rhinorrhea.  
Right Sinus: No maxillary sinus   
tenderness or frontal sinus   
tenderness.  
Left Sinus: No maxillary sinus   
tenderness or frontal sinus   
tenderness.  
Mouth/Throat:  
Lips: No lesions.  
Mouth: Mucous membranes are moist.   
No oral lesions.  
Dentition: No gum lesions.  
Tongue: No lesions. Tongue does not   
deviate from midline.  
Palate: No mass and lesions.  
Pharynx: Oropharynx is clear. No   
pharyngeal swelling, oropharyngeal   
exudate, posterior oropharyngeal   
erythema or uvula swelling.  
Tonsils: No tonsillar exudate or   
tonsillar abscesses.  
Eyes:  
General: Lids are normal. No scleral   
icterus.  
Right eye: No discharge.  
Left eye: No discharge.  
Extraocular Movements: Extraocular   
movements intact.  
Conjunctiva/sclera: Conjunctivae   
normal.  
Pupils: Pupils are equal, round, and   
reactive to light.  
Cardiovascular:  
Rate and Rhythm: Normal rate and   
regular rhythm.  
Heart sounds: Normal heart sounds.  
Pulmonary:  
Effort: Pulmonary effort is normal.  
Breath sounds: Normal breath sounds   
and air entry.  
Musculoskeletal:  
Cervical back: Full passive range of   
motion without pain. No spinous   
process tenderness or muscular   
tenderness.  
Lymphadenopathy:  
Head:  
Right side of head: No submental,   
submandibular, tonsillar,   
preauricular or posterior auricular   
adenopathy.  
Left side of head: No submental,   
submandibular, tonsillar,   
preauricular or posterior auricular   
adenopathy.  
Cervical: No cervical adenopathy.  
Skin:  
General: Skin is warm.  
Capillary Refill: Capillary refill   
takes less than 2 seconds.  
Findings: No rash.  
Neurological:  
General: No focal deficit present.  
Mental Status: He is alert and   
oriented to person, place, and time.  
Cranial Nerves: No facial asymmetry.  
Psychiatric:  
Attention and Perception: Attention   
normal.  
Behavior: Behavior is cooperative.  
  
ASSESSMENT/PLAN:  
1. Right acute suppurative otitis   
media - ICD9: 382.00, ICD10: H66.001   
(primary diagnosis)  
  
- AMOXICILLIN 875 MG TABLET  
  
2. Acute otitis externa of right   
ear, unspecified type - ICD9:   
380.10, ICD10: H60.501  
  
- OFLOXACIN 0.3 % EAR DROPS  
Check with ENT to make sure this is   
safe  
  
Encourage fluids, rest.  
Tylenol and Motrin for pain  
Take entire course of Antibiotics.  
  
Call PCP if sx worsen or no better.  
If symptoms worsen, or new symptoms   
develop go to ER.  
If you have worsening of breathing   
or breathing changes- go to ER.  
If you have persistent fever   
unrelieved by Tylenol/Motrin- go to   
the ER.  
Discussed all red flag symptoms and   
reasons to go to ER  
No further questions.  
Follow up as needed.  
The patient verbalizes understanding   
and is in agreement with plan of   
care.  
Barriers to learning: none.  
  
Dara Maria PA-C  
  
Medical Decision Making:  
  
Problems:  
Moderate: Acute illness with   
systemic symptoms  
Risk:  
Low: Low risk from testing/treatment  
Moderate: Drug management  
  
Medical Decision Making Level: 4 -   
Moderate  
  
I spent a total of 20 minutes on the   
date of the service which included   
preparing to see the patient,   
face-to-face patient care,   
completing clinical documentation,   
performing a medically appropriate   
examination, counseling and   
educating the   
patient/family/caregiver, and   
ordering medications, tests, or   
procedures.  
  
Electronically signed by Dara Maria PA-C at 2023 3:13 PM   
EST  
documented in this encounter            [facility]  
  
  
  
Evaluation note  
  
  
                                Note Date & Type Note            Facility  
  
  
  
                                                    Evaluation note   
  
  
  
                                                    Diagnosis  
   
                                                      
  
  
Right acute suppurative otitis media- Primary  
  
  
Acute suppurative otitis media without spontaneous rupture of eardrum  
   
                                                      
  
  
Acute otitis externa of right ear, unspecified type  
  
documented in this encounter  
[facility]  
  
Evaluation note  
  
  
                                Note Date & Type Note            Facility  
  
  
  
                                                    Evaluation note   
  
  
  
                                                    Diagnosis  
   
                                                      
  
  
Strain of other muscles, fascia and tendons at shoulder and upper arm level, 
left   
arm, initial encounter- Primary  
  
documented in this encounter  
[facility]  
  
Reason for referral (narrative)  
                     Consultation (Routine) - Pending Review  
  
                                Note Date & Type Note            Facility  
  
  
  
                                                    Reason for referral (narrati  
ve)   
  
  
  
                          Specialty    Diagnoses / Procedures Referred By Contac  
t Referred To Contact  
   
                                        Physical Therapy      
  
  
Diagnoses  
  
  
Strain of other muscles,   
fascia and tendons at   
shoulder and upper arm   
level, left arm, initial   
encounter  
  
  
  
Procedures  
  
  
DC OFFICE/OUTPATIENT NEW   
[facility] 60-74 MINUTES                    
  
  
Hodan Zendejas MD  
  
  
[address]  
  
  
[place] [address]  
  
  
Phone: [phone]  
  
  
Fax: [phone]                         
  
  
[facility]  
  
  
[address] [place]   
[address]  
  
  
Phone: [phone]  
  
  
Fax: [phone]  
  
  
  
                          Referral ID  Status       Reason       Start   
Date                                    Expiration   
Date                                    Visits   
Requested                               Visits   
Authorized  
   
                                        [id number]               Pending   
Review                                    
  
  
Specialty   
Services   
Required                                10/23/202  
2                   2023           1                   1  
  
  
  
  
Electronically signed by Hodan Zendejas MD at 10/23/2022 2:28 PM EDT  
  
  
[facility]  
  
Reason for referral (narrative)  
                     Consultation (Routine) - Pending Review  
  
                                Note Date & Type Note            Facility  
  
  
  
                                                    Reason for referral (narrati  
ve)   
  
  
  
                          Specialty    Diagnoses / Procedures Referred By Contac  
t Referred To Contact  
   
                                        Physical Therapy      
  
  
Diagnoses  
  
  
Strain of other muscles,   
fascia and tendons at   
shoulder and upper arm   
level, left arm, initial   
encounter  
  
  
  
Procedures  
  
  
DC OFFICE/OUTPATIENT NEW   
[facility] 60-74 MINUTES                    
  
  
Hodan Zendejas MD  
  
  
[address] [place] [address]  
  
  
Phone: [phone]  
  
  
Fax: [phone]                         
  
  
[facility]  
  
  
[address] [place]   
[address]  
  
  
Phone: [phone]  
  
  
Fax: [phone]  
  
  
  
                          Referral ID  Status       Reason       Start   
Date                                    Expiration   
Date                                    Visits   
Requested                               Visits   
Authorized  
   
                                        [id number]               Pending   
Review                                    
  
  
Specialty   
Services   
Required        2022       5/3/2023        1               1  
  
  
  
  
Electronically signed by Hodan Zendejas MD at 2022 10:13 AM [facility]  
  
Chief Complaint  
  
  
                                        Chief Complaint Description Start Date  
   
                                        left knee pain        
   
                                                            Preliminary chief co  
mplaint data, not yet signed by the   
author as of   
  
  
  
Instructions  
  
  
                                        Instruction Description Start Date  
   
                                                    CompletedPatient advised to   
follow-up with Primary Care Physician for BMI   
management.                               
  
  
  
Advance Directives  
                           Latest Code Status on File  
  
                          Code Status  Date Activated Date Inactivated Comments  
   
                          Full Code    3/5/2020 11:25 PM                
  
                           Latest Code Status on File  
  
                          Code Status  Date Activated Date Inactivated Comments  
   
                          Full Code    3/5/2020 11:25 PM 3/7/2020 3:50 PM   
  
  
  
Assessments  
  
  
                                                    Diagnosis  
   
                                                      
  
  
Chest pain, unspecified type  
  
  
  
Review of System  
There may be information available, but it has not been provided by the sender.  
  
Family History  
There may be information available, but it has not been provided by the 
sender.No Family History Records FoundNo Family History Records FoundNo Family 
History Records Found  
  
History of Present Illness  
There may be information available, but it has not been provided by the sender.
  * Kulwinder Hernandez DO - 2020 10:34 AM EST  
  
Formatting of this note might be different from the original.  
  
  
Hospitalist Progress Note  
3/7/2020 10:34 AM  
  
[phone]: Please page me ([phone]) for patient care issues.  
[phone]: Please page [facility] night Hospitalist for any issues.  
Subjective:  
Admit Date: 3/5/2020 PCP: JET NEIL  
Room#: 246/2461  
  
Interval History: No overnight issues. Denies chest pain, sob, abdominal pain, 
nausea, vomiting, diarrhea, constipation, fevers, or chills.  
  
DIET GENERAL; No Caffeine  
Patient Vitals for the past 96 hrs (Last 3 readings):  
Weight  
20 0639 242 lb 9.6 oz (110 kg)  
20 0529 242 lb 9.6 oz (110 kg)  
  
24HR INTAKE/OUTPUT:  
  
Intake/Output Summary (Last 24 hours) at 3/7/2020 1034  
Last data filed at 3/7/2020 0918  
Gross per 24 hour  
Intake 310 ml  
Output  
Net 310 ml  
  
Past Medical History:  
  
Diagnosis Date  
Hypertension  
  
Medications:  
  
valsartan 80 mg Oral Daily  
aspirin 81 mg Oral Daily  
sodium chloride flush 10 mL Intravenous 2 times per day  
enoxaparin 40 mg Subcutaneous Daily  
  
LABS:  
CBC:  
Recent Labs  
20  
17320  
0421 20  
0427  
WBC 5.9 4.8 4.9  
RBC 4.48 4.37* 4.37*  
HGB 14.1 13.7 13.9  
HCT 42.2 41.1 41.3  
MCV 94.3 94.2 94.5  
RDW 13.6 13.5 13.7  
 189 263  
  
BMP:  
Recent Labs  
20  
17320  
0427  
 141  
K 4.1 5.5*  
 106  
CO2 27 25  
BUN 17 18  
CREATININE 1.18 0.97  
GLUCOSE 101* 99  
CALCIUM 9.0 9.0  
ANIONGAP 9 10  
  
LIVER PROFILE:No results for input(s): AST, ALT, BILITOT, ALKPHOS, LABALBU, PROT
 in the last 72 hours.  
PT/INR: No results for input(s): PROTIME, INR in the last 72 hours.  
CARDIAC ENZYMES:  
Recent Labs  
20  
0009 20  
0421  
TROPONINI <0.012 <0.012 <0.012  
  
Procalcitonin: No results found for: PROCAL  
Objective:  
Vitals: /83   Pulse 60   Temp 97.1 F (36.2 C) (Temporal)   Resp 16   Wt 
242 lb 9.6 oz (110 kg)   SpO2 95%  
Pulse Ox: SpO2 Av.4 % Min: 94 % Max: 98 %  
Supplemental O2:  
General appearance: No apparent distress, appears stated age and cooperative 
with exam  
HEENT: Normal cephalic, atraumatic without obvious deformity. Pupils equal, 
round, and reactive to light. Extra ocular muscles intact. Conjunctivae/corneas 
clear.  
Neck: Supple, with full range of motion. No jugular venous distention. Trachea 
midline. No lymphadenopathy.  
Respiratory: Normal respiratory effort. Clear to auscultation, bilaterally 
without Rales/Wheezes/Rhonchi.  
Cardiovascular: Regular rate and rhythm with normal S1/S2 without murmurs, rubs 
or gallops.  
Abdomen: Soft, non-tender, non-distended with normal bowel sounds. No rebound or
 guarding.  
Musculoskeletal: No clubbing, cyanosis or edema bilaterally. Full range of 
motion without deformity.  
Skin: Skin color, texture, turgor normal. No rashes or lesions.  
Neurologic: Neurovascularly intact without any focal sensory/motor deficits. 
Cranial nerves: II-XIIintact, grossly non-focal.  
Assessment  
1. Chest pain-rule out cardiac cause  
2. Hypertension  
3. Hyperkalemia  
Plan  
-telemetry monitoring, stress test and 2D echo pending  
-am labs, replace lytes prn  
-increase activity  
-DVT prophylaxis: [x] Lovenox  
[] Heparin  
[] SCDs  
[x] Encourage ambulation  
[] Already on Anticoagulation  
Advance Directive: Full Code  
Discharge planning: ok to discharge pending stress test and echo results  
  
Kulwinder Hernandez DO  
Division of Hospitalist Medicine  
Inpatient Medical Services  
PAGER: [phone]  
  
  
  
  
  
Electronically signed by Kulwinder Hernandez DO at 2020 11:16 AM EST  
  
  
* Alexandra Luke DTR - 2020 7:16 AM EST  
  
Nutrition rescreen complete. Pt assigned a level one for nutrition care.  
  
  
  
  
Electronically signed by Alexandra Luke DTR at 2020 7:16 AM EST  
  
  
* Kulwinder Hernandez DO - 2020 2:21 PM EST  
  
Needs part 2 of stress test. Unable to discharge today.  
  
  
  
Electronically signed by Kulwinder Hernandez DO at 2020 2:21 PM EST  
  
  
* Kulwinder Hernandez DO - 2020 11:44 AM EST  
  
Formatting of this note might be different from the original.  
  
  
Hospitalist Progress Note  
3/6/2020 2:21 PM  
  
[phone]: Please page me ([phone]) for patient care issues.  
[phone]: Please page [facility] night Hospitalist for any issues.  
Subjective:  
Admit Date: 3/5/2020 PCP: JET NEIL  
Room#: 246/2461  
  
Interval History: No overnight issues. Denies chest pain, sob, abdominal pain, 
nausea, vomiting, diarrhea, constipation, fevers, or chills.  
  
DIET GENERAL; No Caffeine  
Patient Vitals for the past 96 hrs (Last 3 readings):  
Weight  
20 0529 242 lb 9.6 oz (110 kg)  
  
24HR INTAKE/OUTPUT:  
  
Intake/Output Summary (Last 24 hours) at 3/6/2020 1421  
Last data filed at 3/6/2020 1303  
Gross per 24 hour  
Intake 300 ml  
Output  
Net 300 ml  
  
Past Medical History:  
  
Diagnosis Date  
Hypertension  
  
Medications:  
  
valsartan 80 mg Oral Daily  
aspirin 81 mg Oral Daily  
sodium chloride flush 10 mL Intravenous 2 times per day  
enoxaparin 40 mg Subcutaneous Daily  
  
LABS:  
CBC:  
Recent Labs  
20  
1735 20  
0421  
WBC 5.9 4.8  
RBC 4.48 4.37*  
HGB 14.1 13.7  
HCT 42.2 41.1  
MCV 94.3 94.2  
RDW 13.6 13.5  
 189  
  
BMP:  
Recent Labs  
20  
1735  
  
K 4.1  
  
CO2 27  
BUN 17  
CREATININE 1.18  
GLUCOSE 101*  
CALCIUM 9.0  
ANIONGAP 9  
  
LIVER PROFILE:No results for input(s): AST, ALT, BILITOT, ALKPHOS, LABALBU, PROT
 in the last 72 hours.  
PT/INR: No results for input(s): PROTIME, INR in the last 72 hours.  
CARDIAC ENZYMES:  
Recent Labs  
20  
1735 20  
0009 20  
0421  
TROPONINI <0.012 <0.012 <0.012  
  
Procalcitonin: No results found for: PROCAL  
Objective:  
Vitals: /76   Pulse 75   Temp 98.3 F (36.8 C) (Temporal)   Resp 16   Wt 
242 lb 9.6 oz (110 kg)   SpO2 94%  
Pulse Ox: SpO2 Av.1 % Min: 92 % Max: 98 %  
Supplemental O2:  
General appearance: No apparent distress, appears stated age and cooperative 
with exam  
HEENT: Normal cephalic, atraumatic without obvious deformity. Pupils equal, 
round, and reactive to light. Extra ocular muscles intact. Conjunctivae/corneas 
clear.  
Neck: Supple, with full range of motion. No jugular venous distention. Trachea 
midline. No lymphadenopathy.  
Respiratory: Normal respiratory effort. Clear to auscultation, bilaterally 
without Rales/Wheezes/Rhonchi.  
Cardiovascular: Regular rate and rhythm with normal S1/S2 without murmurs, rubs 
or gallops.  
Abdomen: Soft, non-tender, non-distended with normal bowel sounds. No rebound or
 guarding.  
Musculoskeletal: No clubbing, cyanosis or edema bilaterally. Full range of 
motion without deformity.  
Skin: Skin color, texture, turgor normal. No rashes or lesions.  
Neurologic: Neurovascularly intact without any focal sensory/motor deficits. 
Cranial nerves: II-XIIintact, grossly non-focal.  
Assessment  
1. Chest pain-rule out cardiac cause  
2. Hypertension  
Plan  
-telemetry monitoring, stress test pending, will obtain 2D echo  
-am labs, replace lytes prn  
-increase activity  
-DVT prophylaxis: [x] Lovenox  
[] Heparin  
[] SCDs  
[x] Encourage ambulation  
[] Already on Anticoagulation  
Advance Directive: Full Code  
Discharge planning: ok to discharge pending stress test results  
  
Kulwinder Hernandez DO  
Division of Hospitalist Medicine  
Inpatient Medical Services  
PAGER: [phone]  
  
  
  
  
  
Electronically signed by Kulwinder Hernandez DO at 2020 2:24 PM EST  
  
  
documented in this encounter  
  
Discharge Instructions  
* Discharge Instr - Activity*   
  
Kulwinder Hernandez DO - 2020 11:44 AM EST  
  
  
  
As tolerated  
  
  
  
Electronically signed by Kulwinder Hernandez DO at 2020 11:44 AM EST  
  
  
  
  
* Discharge Instr - Diet*   
  
Rogelio Guzman RN - 2020 11:44 AM EST  
  
  
  
  
Heart Healthy  
  
  
  
Electronically signed by Rogelio Guzman RN at 2020 11:26 AM EST  
  
  
  
  
* Additional Instructions*   
  
Rogelio Guzman RN - 2020  
  
  
  
Formatting of this note might be different from the original.  
GENERAL SIGNS AND SYMPTOMS  
GREEN ZONE: All Clear- Your Symptoms Are Under Control  
No recurrence of symptoms that led to hospitalization  
Able to do usual activities  
No fever  
No chest pain  
No shortness of breath  
This Means You Should:  
Continue taking your medications as prescribed  
Continue activity as tolerated  
Keep all doctor appointments  
  
YELLOW ZONE: Caution as Your Health may be Worsening  
Recurrence of symptoms that led to hospitalization  
Fever of 100 degrees or higher  
Increased fatigue or restlessness  
Intolerant side-effects of medications  
Uneasy feeling or that something is wrong  
This Means You Should:  
Call your doctor for further instructions  
***  
  
RED ZONE: Medical Alert  
Severe or unrelieved shortness of breath at rest  
Unrelieved chest pain  
Confusion or you can't think clearly  
This Means You Should Call 911 Immediately  
  
  
  
  
  
  
* Attachments  
  
The following attachments cannot be sent through Care Everywhere.  
  
* Chest Pain (English)  
  
documented in this encounter  
  
Summary Purpose  
  
  
                                                      
  
  
  
Additional Source Comments  
  
  
  
                                                    Reason for Visit (unrecogniz  
ed section and content)  
   
                                          
  
  
  
                                                    Reason For Visit Description  
   
                                        New - 1st visit with practice   
   
                                                            Preliminary reason f  
or visit data, not yet   
signed by the author as of   
   
                                        left knee pain        
  
  
  
                                        Reason              Comments  
   
                                        Chest Pain            
  
  
  
                                        Reason              Comments  
   
                                        Ear Problem         Left ear pain starte  
d a few days ago.  
  
  
  
  
  
                                                    Care Teams (unrecognized sec  
tion and content)  
   
                                          
  
  
  
                      Team Member Relationship Specialty  Start Date End Date  
   
                                                      
  
  
Jet Neil  
  
  
[address]  
  
  
[place] [address]  
  
  
[phone] (Work)  
  
  
[phone] (Fax) PCP - General                   16           
  
  
  
                      Team Member Relationship Specialty  Start Date End Date  
   
                                                      
  
  
Jet Neil, APRN.CNP  
  
  
[address] [facility] [address]  
  
  
[place], [place] [address] [phone] (Work)  
  
  
[phone] (Fax) PCP - General   Family Medicine 4/15/17           
   
                                                      
  
  
Amari Genao  
  
  
[address]  
  
  
  
  
  
[place] [address] [phone] (Work)  
  
  
[phone] (Fax) Referring       Ent - Otolaryngology 3/12/20           
  
  
  
                      Team Member Relationship Specialty  Start Date End Date  
   
                                                      
  
  
Jet Neil  
  
  
[address]  
  
  
[place], [place] [address] [phone] (Work)  
  
  
[phone] (Fax) PCP - General                   16           
  
  
  
  
  
                                                    PREGNANCY (unrecognized sect  
ion and content)  
   
                                                    No Pregnancy Status Records   
FoundNo Pregnancy Status Records FoundNo Pregnancy   
Status   
Records Found  
  
  
  
  
                                                    INFORMATION SOURCE (unrecogn  
ized section and content)  
   
                                          
  
  
  
                                        DATE CREATED        AUTHOR  
   
                                10/27/2022                      [facility]  
tem  
  
  
  
                                DATE CREATED    AUTHOR          AUTHOR'S ORGANIZ  
ATION  
   
                                2022                      [facility]  
tem [facility]  
  
  
  
                                DATE CREATED    AUTHOR          AUTHOR'S ORGANIZ  
ATION  
   
                                10/19/2023                      [facility]  
  
  
  
  
  
                                                    Source Comments (unrecognize  
d section and content)  
   
                                                    In the event this informatio  
n is protected by the Federal Confidentiality of   
Alcohol   
and Drug Abuse Patient Records regulations: This information has been disclosed 
to   
you from records protected by Federal confidentiality rules (42 CFR Part 2). The
   
Federal rules prohibit you from making any further disclosure of this 
information   
unless further disclosure is expressly permitted by the written consent of the 
person   
to whom it pertains or as otherwise permitted by 42 CFR Part 2. A general   
authorization for the release of medical or other information is NOT sufficient 
for   
this purpose. The Federal rules restrict any use of the information to 
criminally   
investigate or prosecute any alcohol or drug abuse patient.[facility]  
  
FOR RECORDS PERTAINING TO PATIENTS WHO ARE OR HAVE BEEN ENROLLED IN A CHEMICAL 
DEPENDENCY/SUBSTANCEABUSE PROGRAM, SOME INFORMATION MAY BE OMITTED. This 
clinical summary was aggregated from multiple sources. Caution should be 
exercised in using it in the provision of clinical care. This summary normalizes
 information from multiple sources, and as a consequence, information in this 
document may materially change the coding, format and clinical context of 
patient data. In addition, data may be omitted in some cases. CLINICAL DECISIONS
 SHOULD BE BASED ON THE PRIMARY CLINICAL RECORDS. [facility]. provides
 no warranty or guarantee of the accuracy or completeness of information in this
 document.